# Patient Record
Sex: MALE | Race: BLACK OR AFRICAN AMERICAN | NOT HISPANIC OR LATINO | Employment: FULL TIME | URBAN - METROPOLITAN AREA
[De-identification: names, ages, dates, MRNs, and addresses within clinical notes are randomized per-mention and may not be internally consistent; named-entity substitution may affect disease eponyms.]

---

## 2019-07-15 ENCOUNTER — VIRTUAL VISIT (OUTPATIENT)
Dept: INTERNAL MEDICINE | Facility: CLINIC | Age: 44
End: 2019-07-15

## 2019-07-15 DIAGNOSIS — Z00.00 ENCOUNTER FOR PREVENTIVE HEALTH EXAMINATION: Primary | ICD-10-CM

## 2019-07-15 RX ORDER — ASCORBIC ACID 500 MG
TABLET ORAL
COMMUNITY

## 2019-07-15 ASSESSMENT — ENCOUNTER SYMPTOMS
NAIL CHANGES: 0
SKIN CHANGES: 0
SKIN CHANGES: 0
NAIL CHANGES: 0
POOR WOUND HEALING: 0
POOR WOUND HEALING: 0

## 2019-07-15 ASSESSMENT — ANXIETY QUESTIONNAIRES
6. BECOMING EASILY ANNOYED OR IRRITABLE: NOT AT ALL
4. TROUBLE RELAXING: NOT AT ALL
2. NOT BEING ABLE TO STOP OR CONTROL WORRYING: NOT AT ALL
GAD7 TOTAL SCORE: 0
3. WORRYING TOO MUCH ABOUT DIFFERENT THINGS: NOT AT ALL
1. FEELING NERVOUS, ANXIOUS, OR ON EDGE: NOT AT ALL
7. FEELING AFRAID AS IF SOMETHING AWFUL MIGHT HAPPEN: NOT AT ALL
5. BEING SO RESTLESS THAT IT IS HARD TO SIT STILL: NOT AT ALL
4. TROUBLE RELAXING: NOT AT ALL
3. WORRYING TOO MUCH ABOUT DIFFERENT THINGS: NOT AT ALL
7. FEELING AFRAID AS IF SOMETHING AWFUL MIGHT HAPPEN: NOT AT ALL
GAD7 TOTAL SCORE: 0
1. FEELING NERVOUS, ANXIOUS, OR ON EDGE: NOT AT ALL
7. FEELING AFRAID AS IF SOMETHING AWFUL MIGHT HAPPEN: NOT AT ALL
7. FEELING AFRAID AS IF SOMETHING AWFUL MIGHT HAPPEN: NOT AT ALL
GAD7 TOTAL SCORE: 0
2. NOT BEING ABLE TO STOP OR CONTROL WORRYING: NOT AT ALL
5. BEING SO RESTLESS THAT IT IS HARD TO SIT STILL: NOT AT ALL
6. BECOMING EASILY ANNOYED OR IRRITABLE: NOT AT ALL
GAD7 TOTAL SCORE: 0

## 2019-07-15 ASSESSMENT — PATIENT HEALTH QUESTIONNAIRE - PHQ9
SUM OF ALL RESPONSES TO PHQ QUESTIONS 1-9: 0

## 2019-07-15 NOTE — PROGRESS NOTES
Health Maintenance:  Do you have a PCP? No  When was your last visit with your PCP?   When was your last eye exam? 1 year  Have you ever had a colonoscopy? No  If yes, when?   Have you ever had any polyps removed?     30+ Pack Year Smoking History:  Have you ever had a chest CT to screen for cancer? No    If Male:  (65 years old+ and tobacco use) Have you ever completed an ultrasound of your abdominal aorta?       As part of your visit we will set up a DEXA scan which will measure your body composition. We have a few questions that need to be answered before we can schedule this scan:   What is your approximate weight? 195   Have you ever had a DEXA scan within the past 2 years? No   Will you have any other imaging studies with contrast (x-ray, CT scan) within 7  days of this appointment? No   Have you had any spine or hip surgery? No   Do you take any vitamins that contain calcium or antacids with calcium?     If yes, stop taking 24 hours prior to visit.     Goals for the Visit:  1. Thorough Comprehensive Preventative Exam  2. Derm    Pertinent past Medical/Family and Social HX: Mom breast cancer, son had cancer  Pertinent sx that desire are addressed with this visit:     Answers for HPI/ROS submitted by the patient on 7/15/2019   PHQ9 TOTAL SCORE: 0  DARLENE 7 TOTAL SCORE: 0  General Symptoms: No  Skin Symptoms: Yes  HENT Symptoms: No  EYE SYMPTOMS: No  HEART SYMPTOMS: No  LUNG SYMPTOMS: No  INTESTINAL SYMPTOMS: No  URINARY SYMPTOMS: No  REPRODUCTIVE SYMPTOMS: No  SKELETAL SYMPTOMS: No  BLOOD SYMPTOMS: No  NERVOUS SYSTEM SYMPTOMS: No  MENTAL HEALTH SYMPTOMS: No  Changes in hair: No  Changes in moles/birth marks: No  Itching: Yes  Rashes: No  Changes in nails: No  Acne: No  Change in facial hair: No  Warts: No  Non-healing sores: No  Scarring: No  Flaking of skin: Yes  Color changes of hands/feet in cold : No  Sun sensitivity: No  Skin thickening: No        Instructions prior to appointment:   1. Fast beginning at 10  pm for lab appointment  2. If your preventive care assessment package includes a Fitness Assessment, please bring athletic shoes. Complementary [facility] fitness attire is provided and yours to keep.  3. If eye exam, eyes may be dilated, it will last 4-6 hours, may want to bring sunglasses.   4. May bring laptop or other work materials for use during downtime.   5. You will receive an email about 3 days prior to your visit with a final itinerary, menu selections for the complementary breakfast and lunch and instructions for the visit.     Complimentary  Parking provided. Drop off car in front of [facility], take the patient elevators to the [facility] clinic. When you enter in the lobby, identify yourself as an Executive Health [atient and you will be escorted up to the clinic.   If questions arise prior to your appointment please contact the clinic at [phone].

## 2019-07-16 ASSESSMENT — PATIENT HEALTH QUESTIONNAIRE - PHQ9
SUM OF ALL RESPONSES TO PHQ QUESTIONS 1-9: 0
SUM OF ALL RESPONSES TO PHQ QUESTIONS 1-9: 0

## 2019-07-16 ASSESSMENT — ANXIETY QUESTIONNAIRES
GAD7 TOTAL SCORE: 0
GAD7 TOTAL SCORE: 0

## 2019-07-18 ENCOUNTER — APPOINTMENT (OUTPATIENT)
Dept: INTERNAL MEDICINE | Facility: CLINIC | Age: 44
End: 2019-07-18
Payer: COMMERCIAL

## 2019-07-18 ENCOUNTER — OFFICE VISIT (OUTPATIENT)
Dept: AUDIOLOGY | Facility: CLINIC | Age: 44
End: 2019-07-18
Payer: COMMERCIAL

## 2019-07-18 ENCOUNTER — ANCILLARY PROCEDURE (OUTPATIENT)
Dept: GENERAL RADIOLOGY | Facility: CLINIC | Age: 44
End: 2019-07-18
Attending: INTERNAL MEDICINE
Payer: COMMERCIAL

## 2019-07-18 ENCOUNTER — OFFICE VISIT (OUTPATIENT)
Dept: DERMATOLOGY | Facility: CLINIC | Age: 44
End: 2019-07-18
Payer: COMMERCIAL

## 2019-07-18 ENCOUNTER — OFFICE VISIT (OUTPATIENT)
Dept: OPHTHALMOLOGY | Facility: CLINIC | Age: 44
End: 2019-07-18

## 2019-07-18 ENCOUNTER — ANCILLARY PROCEDURE (OUTPATIENT)
Dept: BONE DENSITY | Facility: CLINIC | Age: 44
End: 2019-07-18
Payer: COMMERCIAL

## 2019-07-18 ENCOUNTER — APPOINTMENT (OUTPATIENT)
Dept: INTERNAL MEDICINE | Facility: CLINIC | Age: 44
End: 2019-07-18

## 2019-07-18 ENCOUNTER — OFFICE VISIT (OUTPATIENT)
Dept: INTERNAL MEDICINE | Facility: CLINIC | Age: 44
End: 2019-07-18

## 2019-07-18 VITALS
HEIGHT: 72 IN | RESPIRATION RATE: 16 BRPM | SYSTOLIC BLOOD PRESSURE: 128 MMHG | WEIGHT: 212 LBS | OXYGEN SATURATION: 97 % | BODY MASS INDEX: 28.71 KG/M2 | HEART RATE: 66 BPM | DIASTOLIC BLOOD PRESSURE: 86 MMHG | TEMPERATURE: 98.1 F

## 2019-07-18 DIAGNOSIS — Z86.69 HISTORY OF UVEITIS: ICD-10-CM

## 2019-07-18 DIAGNOSIS — Z00.00 ENCOUNTER FOR PREVENTIVE HEALTH EXAMINATION: Primary | ICD-10-CM

## 2019-07-18 DIAGNOSIS — Z01.10 EXAMINATION OF EARS AND HEARING: Primary | ICD-10-CM

## 2019-07-18 DIAGNOSIS — Z00.00 ROUTINE GENERAL MEDICAL EXAMINATION AT A HEALTH CARE FACILITY: Primary | ICD-10-CM

## 2019-07-18 DIAGNOSIS — R31.29 MICROSCOPIC HEMATURIA: ICD-10-CM

## 2019-07-18 DIAGNOSIS — Z00.00 ENCOUNTER FOR PREVENTIVE HEALTH EXAMINATION: ICD-10-CM

## 2019-07-18 DIAGNOSIS — Z98.890 STATUS POST RECONSTRUCTION OF ANTERIOR CRUCIATE LIGAMENT: ICD-10-CM

## 2019-07-18 DIAGNOSIS — H52.11 MYOPIA OF RIGHT EYE: Primary | ICD-10-CM

## 2019-07-18 DIAGNOSIS — Z77.122 EXPOSURE TO NOISE: ICD-10-CM

## 2019-07-18 DIAGNOSIS — L30.0 NUMMULAR ECZEMA: ICD-10-CM

## 2019-07-18 DIAGNOSIS — Z98.52 S/P VASECTOMY: ICD-10-CM

## 2019-07-18 DIAGNOSIS — H52.221 REGULAR ASTIGMATISM OF RIGHT EYE: ICD-10-CM

## 2019-07-18 DIAGNOSIS — D70.9 NEUTROPENIA, UNSPECIFIED TYPE (H): ICD-10-CM

## 2019-07-18 DIAGNOSIS — L73.1 PSEUDOFOLLICULITIS BARBAE: Primary | ICD-10-CM

## 2019-07-18 LAB
ALBUMIN UR-MCNC: NEGATIVE MG/DL
ALP SERPL-CCNC: 72 U/L (ref 40–150)
ALT SERPL W P-5'-P-CCNC: 29 U/L (ref 0–70)
APPEARANCE UR: CLEAR
BASOPHILS # BLD AUTO: 0 10E9/L (ref 0–0.2)
BASOPHILS NFR BLD AUTO: 0.8 %
BILIRUB UR QL STRIP: NEGATIVE
CHOLEST SERPL-MCNC: 214 MG/DL
COLOR UR AUTO: YELLOW
CREAT SERPL-MCNC: 0.96 MG/DL (ref 0.66–1.25)
DEPRECATED CALCIDIOL+CALCIFEROL SERPL-MC: 28 UG/L (ref 20–75)
DIFFERENTIAL METHOD BLD: ABNORMAL
EOSINOPHIL # BLD AUTO: 0.2 10E9/L (ref 0–0.7)
EOSINOPHIL NFR BLD AUTO: 5.1 %
ERYTHROCYTE [DISTWIDTH] IN BLOOD BY AUTOMATED COUNT: 13.6 % (ref 10–15)
GFR SERPL CREATININE-BSD FRML MDRD: >90 ML/MIN/{1.73_M2}
GLUCOSE SERPL-MCNC: 94 MG/DL (ref 70–99)
GLUCOSE UR STRIP-MCNC: NEGATIVE MG/DL
HCT VFR BLD AUTO: 46.7 % (ref 40–53)
HDLC SERPL-MCNC: 56 MG/DL
HGB BLD-MCNC: 15.1 G/DL (ref 13.3–17.7)
HGB UR QL STRIP: ABNORMAL
HIV 1+2 AB+HIV1 P24 AG SERPL QL IA: NONREACTIVE
IMM GRANULOCYTES # BLD: 0 10E9/L (ref 0–0.4)
IMM GRANULOCYTES NFR BLD: 0 %
KETONES UR STRIP-MCNC: NEGATIVE MG/DL
LDLC SERPL CALC-MCNC: 126 MG/DL
LEUKOCYTE ESTERASE UR QL STRIP: NEGATIVE
LYMPHOCYTES # BLD AUTO: 1.6 10E9/L (ref 0.8–5.3)
LYMPHOCYTES NFR BLD AUTO: 46 %
MCH RBC QN AUTO: 29.8 PG (ref 26.5–33)
MCHC RBC AUTO-ENTMCNC: 32.3 G/DL (ref 31.5–36.5)
MCV RBC AUTO: 92 FL (ref 78–100)
MONOCYTES # BLD AUTO: 0.4 10E9/L (ref 0–1.3)
MONOCYTES NFR BLD AUTO: 12.4 %
MUCOUS THREADS #/AREA URNS LPF: PRESENT /LPF
NEUTROPHILS # BLD AUTO: 1.3 10E9/L (ref 1.6–8.3)
NEUTROPHILS NFR BLD AUTO: 35.7 %
NITRATE UR QL: NEGATIVE
NONHDLC SERPL-MCNC: 158 MG/DL
NRBC # BLD AUTO: 0 10*3/UL
NRBC BLD AUTO-RTO: 0 /100
PH UR STRIP: 6 PH (ref 5–7)
PLATELET # BLD AUTO: 222 10E9/L (ref 150–450)
PSA SERPL-ACNC: 1.37 UG/L (ref 0–4)
RBC # BLD AUTO: 5.06 10E12/L (ref 4.4–5.9)
RBC #/AREA URNS AUTO: 1 /HPF (ref 0–2)
RETICS # AUTO: 66 10E9/L (ref 25–95)
RETICS/RBC NFR AUTO: 1.3 % (ref 0.5–2)
SOURCE: ABNORMAL
SP GR UR STRIP: 1.01 (ref 1–1.03)
TRIGL SERPL-MCNC: 159 MG/DL
TSH SERPL DL<=0.005 MIU/L-ACNC: 0.99 MU/L (ref 0.4–4)
UROBILINOGEN UR STRIP-MCNC: 0 MG/DL (ref 0–2)
WBC # BLD AUTO: 3.5 10E9/L (ref 4–11)
WBC #/AREA URNS AUTO: <1 /HPF (ref 0–5)

## 2019-07-18 RX ORDER — TRIAMCINOLONE ACETONIDE 1 MG/G
OINTMENT TOPICAL 2 TIMES DAILY
Qty: 80 G | Refills: 3 | Status: SHIPPED | OUTPATIENT
Start: 2019-07-18

## 2019-07-18 RX ORDER — CLINDAMYCIN PHOSPHATE 10 UG/ML
LOTION TOPICAL DAILY
Qty: 60 ML | Refills: 3 | Status: SHIPPED | OUTPATIENT
Start: 2019-07-18

## 2019-07-18 ASSESSMENT — VISUAL ACUITY
METHOD: SNELLEN - LINEAR
CORRECTION_TYPE: GLASSES
OD_CC: 20/20
OD_CC+: -12

## 2019-07-18 ASSESSMENT — REFRACTION_MANIFEST
OS_AXIS: 134
OD_AXIS: 085
OS_CYLINDER: +0.75
OD_SPHERE: -6.00
OS_SPHERE: -0.75
OD_CYLINDER: +2.75

## 2019-07-18 ASSESSMENT — EXTERNAL EXAM - LEFT EYE: OS_EXAM: NORMAL

## 2019-07-18 ASSESSMENT — REFRACTION_WEARINGRX
OD_AXIS: 086
OS_SPHERE: -0.75
OS_CYLINDER: +1.00
SPECS_TYPE: SVL
OD_SPHERE: -6.50
OS_AXIS: 116
OD_CYLINDER: +3.00

## 2019-07-18 ASSESSMENT — CONF VISUAL FIELD
OD_NORMAL: 1
OS_NORMAL: 1
METHOD: COUNTING FINGERS

## 2019-07-18 ASSESSMENT — TONOMETRY
OD_IOP_MMHG: 14
IOP_METHOD: TONOPEN
OS_IOP_MMHG: 12

## 2019-07-18 ASSESSMENT — MIFFLIN-ST. JEOR: SCORE: 1871.69

## 2019-07-18 ASSESSMENT — SLIT LAMP EXAM - LIDS
COMMENTS: NORMAL
COMMENTS: NORMAL

## 2019-07-18 ASSESSMENT — PAIN SCALES - GENERAL
PAINLEVEL: NO PAIN (0)
PAINLEVEL: NO PAIN (0)

## 2019-07-18 ASSESSMENT — CUP TO DISC RATIO: OS_RATIO: 0.2

## 2019-07-18 NOTE — PROGRESS NOTES
AUDIOLOGY REPORT  [facility] assessment- maroon level    SUMMARY: Audiology visit completed. See audiogram for results.      RECOMMENDATIONS: Follow-up with Dr. Ojeda. Repeat hearing evaluation as medically indicated, sooner if concerns arise.      Elayne Lim  Audiologist  [place] License  #[id number]

## 2019-07-18 NOTE — LETTER
"7/18/2019       RE: Carlito Wiseman  [address] [place] [address]     Dear Colleague,    Thank you for referring your patient, Carlito Wiseman, to the [facility] at [facility]. Please see a copy of my visit note below.     History and Physical Examination     SUBJECTIVE: Chief complaint: Carlito (pronounced \"ka-CONNIE-ooh\") presents for preventive health review.     Past Medical History:  1.  History of neutropenia, long-standing, with absolute neutrophil readings of 1700 in 2010, 1600 in 12/2012, 900 in 11/2014, and 1060 in 10/2017; history of normal hemoglobin and platelet levels.  2.  History of uveitis, diagnosed in 2009, 2011, and reportedly 2017, treated with glucocorticoids.  3.  Status post arthroscopic right knee ACL reconstruction, 1991 following high school soccer injury  4.  History of partial left Achilles tear following pickup basketball injury, managed conservatively  5.  History of microscopic hematuria with negative CT in 2011; he does not recall having undergone cystoscopy.  6.  Status post vasectomy, 2015  7.  Impaired fasting glucose  8.  History of intermittent lower back pain, responsive to conservative measures; LS-spine x-rays in 2014 were normal; an MRI in 10/2017 showed disc bulging at L4-5 and L5-S1 with S1 nerve root compression and left greater than right neural foraminal narrowing.       Adverse Drug Reactions: None.     Current Medications:  Daily multivitamin supplement  Vitamin C, 500 mg daily     Habits:  Tobacco: Never  Alcohol: 2 servings, 2 days per week  Caffeine: 3-4 servings per day     Social History:  to Crystal and father of daughters Daniela, age 15, a rising sophomore at [employer] who enjoys tennis and STEM subjects, and Brigid, age 13, who enjoys ballet and will begin eighth grade at [employer] this fall; and son Valentin, age 7, a survivor of neuroblastoma at age 2 months who enjoys reading and " Problem: Goal Outcome Summary  Goal: Goal Outcome Summary  Pt able to empty bladder independently. Ambulation and fluids encouraged. Ibuprofen adequately controlling pain. Independent of self and  cares.         collecting basketball and other sports trading cards who will begin second grade at [employer] this fall.  Carlito was born in [place] and completed middle and high schools in [place], after which he attended [employer], where he studied on an [employer] scholarship.  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  Away from work, he enjoys family activity, socializing, and travel.  He exercises 3-4 days per week, typically 45-60 minutes of aerobic and strength exercise with occasional yoga sessions.    Family History: Mother is 68, with history of breast cancer, diagnosed at age 59.  Father is in good health at age 68.  A sister 10 years his constance is in good health.  Son is a survivor of neuroblastoma.  Daughters are in good health.  Maternal grandfather  prematurely from an unspecified illness, with history of excessive tobacco and alcohol use.  Maternal grandmother  prematurely from lung cancer, with history of tobacco use.  Paternal grandfather  from tobacco-related lung cancer.  Paternal grandmother is in good health at age 95.     Review of Systems: No history of colonoscopy.  Most recent tetanus (Tdap) vaccination was administered in 2012.  Typhoid IM vaccination administered in 2013.  Infrequent mild lower back discomfort, typically after extended runs; symptoms respond to stretching, chiropractic intervention, and acupuncture.  Infrequent snoring, without morning headaches, daytime somnolence, or unrefreshed sensation upon awakening.   Remainder of complete review of systems was negative.      OBJECTIVE:     Vital signs: Height 71.5 inches.  Weight 212 pounds.  Blood pressure 129/82 on average of 3 automated readings.  Heart rate 66.  Respiratory rate 16.  Temperature 98.1 degrees.  O2 saturation 97% on room air.  General: Alert, neatly dressed and groomed, in no acute distress.  HEENT: Atraumatic and normocephalic. Eyelids, pupils, and conjunctivae appeared normal. Lips, teeth and gums appear normal.  Oropharynx showed moist mucous membranes, without exudate or erythema.  Narrow airway.  Neck: Supple, without thyromegaly, mass, or bruit. No cervical or supraclavicular lymphadenopathy.  Back: No spinal or costovertebral angle tenderness.  Chest: Clear to auscultation and percussion. Normal respiratory effort.  Cardiovascular: No jugular venous distention. Regular rate and rhythm, normal S1, S2 without murmur.  Abdomen: Bowel sounds positive; soft, nontender, without rebound, guarding, hepatosplenomegaly or mass.  Extremities: No cyanosis or edema.  Genitalia: Normal male genitalia, without scrotal mass or hernia. No inguinal lymphadenopathy.  Rectal: Normal tone, with smooth, nontender, moderately enlarged prostate. No rectal mass.  Skin: Examination was deferred; full evaluation was completed earlier through dermatology clinic.  Neurologic: Cranial nerves II-XII were grossly intact. Sensory and motor examinations were normal. Normal gait.  Mini-cog score was 4/5; 5/5 with minimal prompting.  Psychiatric: Alert and oriented ×3. Normal affect. Judgment and insight intact.  PHQ and DARLENE scores were both 0.    Creatinine 0.96, alkaline phosphatase 72, ALT 29, cholesterol 214, HDL 56, , triglycerides 159, cholesterol/HDL 3.8, PSA 1.37, TSH 0.99, 25-hydroxy vitamin D 28, glucose 94, white blood cell count 3500, ANC 1300, hemoglobin 15.1, platelets 222,000, urinalysis showed small blood, with 1 rbc/hpf, Lyme disease 0.10 (0.00-0.89) Treponema  nonreactive, HIV nonreactive, peripheral blood morphology showed slight leukopenia and neutropenia with normal neutrophil morphology with no other abnormalities.    EKG showed sinus bradycardia.  Spirometry showed an FEV1 of 3.70, with an FVC of 4.40; readings were 95% and 88% of predicted values, respectively.    DEXA showed normal bone density, with most negative and valid Z-score of -0.5 at the level of the right femoral neck and right total hip.  Body composition analysis showed 27.1% fat (percentile not available); body mass index was 29.2.    Chest x-ray showed no acute pulmonary disease; specifically, neither adenopathy suggestive of sarcoidosis nor lung changes to suggest tuberculosis.    ASSESSMENT:    1.  History of recurrent uveitis.  Additional tests were arranged to screen for underlying conditions, with results showing negative Lyme and Treponema screens, with no chest x-ray evidence of sarcoidosis or tuberculosis; HLA-B27 testing is pending.  If negative, as expected, he will be advised to consider quantiferon testing to further exclude tuberculosis.    2.  Narrow airway with history of snoring.  He denies daytime somnolence, morning headaches, and unrefreshed sensation upon awakening.  We agreed that he would ask his wife to monitor his breathing during sleep.  He will schedule sleep clinic consultation if she observes loud snoring, gasping, or pauses in breathing suggestive of sleep apnea.    3.  Neutropenia.  Long-standing and stable, with peripheral blood morphology remarkable only for mild neutropenia.  He will be advised to continue regular monitoring.    4.  History of microscopic hematuria.  Dipstick positive with normal RBC count on microscopy.  History of negative evaluation including CT in 2011.  In the event of recurrent significant hematuria, I would recommend proceeding with urology consultation for cystoscopy.    5.  Preventive care.  I recommended screening colonoscopy.  Immunization  "status is up-to-date.  We discussed the debate regarding the benefit of multivitamin supplements and the importance of selecting a product that does not contain iron should he choose to continue.  He was advised to discontinue his use of supplemental vitamin C and to seek dietary sources of vitamin C instead.  I recommended daily use of a 2000-international unit vitamin D3 supplement.  Using guidelines from the AHA/ACC, his estimated 10-year risk of a vascular event is 4.3% (optimal for his age/gender cohort is 3.2%).  He was congratulated for his regimen of regular exercise and advised to consider interval workouts for time economy and long, slow, distance workouts for \"fat-burning\" effect.  We reviewed strategies to reduce weight and discussed guidelines for appropriate body fat percentage and body mass index.    PLAN: See above.     ~SRT      Again, thank you for allowing me to participate in the care of your patient.      Sincerely,    Jim Ojeda MD      "

## 2019-07-18 NOTE — LETTER
"7/18/2019    RE: Carlito Wiseman  [address]  [facility] [address]      History and Physical Examination     SUBJECTIVE: Chief complaint: Carlito (pronounced \"ka-CONNIE-ooh\") presents for preventive health review.     Past Medical History:  1.  History of neutropenia, long-standing, with absolute neutrophil readings of 1700 in 2010, 1600 in 12/2012, 900 in 11/2014, and 1060 in 10/2017; history of normal hemoglobin and platelet levels.  2.  History of uveitis, diagnosed in 2009, 2011, and reportedly 2017, treated with glucocorticoids.  3.  Status post arthroscopic right knee ACL reconstruction, 1991 following high school soccer injury  4.  History of partial left Achilles tear following pickup basketball injury, managed conservatively  5.  History of microscopic hematuria with negative CT in 2011; he does not recall having undergone cystoscopy.  6.  Status post vasectomy, 2015  7.  Impaired fasting glucose  8.  History of intermittent lower back pain, responsive to conservative measures; LS-spine x-rays in 2014 were normal; an MRI in 10/2017 showed disc bulging at L4-5 and L5-S1 with S1 nerve root compression and left greater than right neural foraminal narrowing.       Adverse Drug Reactions: None.     Current Medications:  Daily multivitamin supplement  Vitamin C, 500 mg daily     Habits:  Tobacco: Never  Alcohol: 2 servings, 2 days per week  Caffeine: 3-4 servings per day     Social History:  to Amanda and father of daughters Daniela, age 15, a rising sophomore at [employer] who enjoys tennis and STEM subjects, and Brigid, age 13, who enjoys ballet and will begin eighth grade at [employer] this fall; and son Valentin, age 7, a survivor of neuroblastoma at age 2 months who enjoys reading and collecting basketball and other sports trading cards who will begin second grade at [employer] this fall.  Carlito was born in [place] and completed middle and high schools in [place], after which he attended " [employer], where he studied on an [employer] scholarship.  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  Away from work, he enjoys family activity, socializing, and travel.  He exercises 3-4 days per week, typically 45-60 minutes of aerobic and strength exercise with occasional yoga sessions.    Family History: Mother is 68, with history of breast cancer, diagnosed at age 59.  Father is in good health at age 68.  A sister 10 years his constance is in good health.  Son is a survivor of neuroblastoma.  Daughters are in good health.  Maternal grandfather  prematurely from an unspecified illness, with history of excessive tobacco and alcohol use.  Maternal grandmother  prematurely from lung cancer, with history of tobacco use.  Paternal grandfather  from tobacco-related lung cancer.  Paternal grandmother is in good health at age 95.     Review of Systems: No history of colonoscopy.  Most recent tetanus (Tdap) vaccination was administered in 2012.  Typhoid IM vaccination administered in 2013.  Infrequent mild lower back discomfort, typically after extended runs; symptoms respond to stretching, chiropractic intervention, and acupuncture.  Infrequent snoring, without morning headaches, daytime somnolence, or unrefreshed sensation upon awakening.  Remainder of complete review of systems was negative.      OBJECTIVE:     Vital signs: Height 71.5 inches.  Weight 212 pounds.  Blood pressure 129/82 on average of 3 automated readings.  Heart rate 66.   Respiratory rate 16.  Temperature 98.1 degrees.  O2 saturation 97% on room air.  General: Alert, neatly dressed and groomed, in no acute distress.  HEENT: Atraumatic and normocephalic. Eyelids, pupils, and conjunctivae appeared normal. Lips, teeth and gums appear normal.  Oropharynx showed moist mucous membranes, without exudate or erythema.  Narrow airway.  Neck: Supple, without thyromegaly, mass, or bruit. No cervical or supraclavicular lymphadenopathy.  Back: No spinal or costovertebral angle tenderness.  Chest: Clear to auscultation and percussion. Normal respiratory effort.  Cardiovascular: No jugular venous distention. Regular rate and rhythm, normal S1, S2 without murmur.  Abdomen: Bowel sounds positive; soft, nontender, without rebound, guarding, hepatosplenomegaly or mass.  Extremities: No cyanosis or edema.  Genitalia: Normal male genitalia, without scrotal mass or hernia. No inguinal lymphadenopathy.  Rectal: Normal tone, with smooth, nontender, moderately enlarged prostate. No rectal mass.  Skin: Examination was deferred; full evaluation was completed earlier through dermatology clinic.  Neurologic: Cranial nerves II-XII were grossly intact. Sensory and motor examinations were normal. Normal gait.  Mini-cog score was 4/5; 5/5 with minimal prompting.  Psychiatric: Alert and oriented ×3. Normal affect. Judgment and insight intact.  PHQ and DARLENE scores were both 0.    Creatinine 0.96, alkaline phosphatase 72, ALT 29, cholesterol 214, HDL 56, , triglycerides 159, cholesterol/HDL 3.8, PSA 1.37, TSH 0.99, 25-hydroxy vitamin D 28, glucose 94, white blood cell count 3500, ANC 1300, hemoglobin 15.1, platelets 222,000, urinalysis showed small blood, with 1 rbc/hpf, Lyme disease 0.10 (0.00-0.89) Treponema nonreactive, HIV nonreactive, peripheral blood morphology showed slight leukopenia and neutropenia with normal neutrophil morphology with no other abnormalities.    EKG showed sinus bradycardia.  Spirometry  showed an FEV1 of 3.70, with an FVC of 4.40; readings were 95% and 88% of predicted values, respectively.    DEXA showed normal bone density, with most negative and valid Z-score of -0.5 at the level of the right femoral neck and right total hip.  Body composition analysis showed 27.1% fat (percentile not available); body mass index was 29.2.    Chest x-ray showed no acute pulmonary disease; specifically, neither adenopathy suggestive of sarcoidosis nor lung changes to suggest tuberculosis.    ASSESSMENT:    1.  History of recurrent uveitis.  Additional tests were arranged to screen for underlying conditions, with results showing negative Lyme and Treponema screens, with no chest x-ray evidence of sarcoidosis or tuberculosis; HLA-B27 testing is pending.  If negative, as expected, he will be advised to consider quantiferon testing to further exclude tuberculosis.    2.  Narrow airway with history of snoring.  He denies daytime somnolence, morning headaches, and unrefreshed sensation upon awakening.  We agreed that he would ask his wife to monitor his breathing during sleep.  He will schedule sleep clinic consultation if she observes loud snoring, gasping, or pauses in breathing suggestive of sleep apnea.    3.  Neutropenia.  Long-standing and stable, with peripheral blood morphology remarkable only for mild neutropenia.  He will be advised to continue regular monitoring.    4.  History of microscopic hematuria.  Dipstick positive with normal RBC count on microscopy.  History of negative evaluation including CT in 2011.  In the event of recurrent significant hematuria, I would recommend proceeding with urology consultation for cystoscopy.    5.  Preventive care.  I recommended screening colonoscopy.  Immunization status is up-to-date.  We discussed the debate regarding the benefit of multivitamin supplements and the importance of selecting a product that does not contain iron should he choose to continue.  He was  "advised to discontinue his use of supplemental vitamin C and to seek dietary sources of vitamin C instead.  I recommended daily use of a 2000-international unit vitamin D3 supplement.  Using guidelines from the AHA/ACC, his estimated 10-year risk of a vascular event is 4.3% (optimal for his age/gender cohort is 3.2%).  He was congratulated for his regimen of regular exercise and advised to consider interval workouts for time economy and long, slow, distance workouts for \"fat-burning\" effect.  We reviewed strategies to reduce weight and discussed guidelines for appropriate body fat percentage and body mass index.    PLAN: See above.     ~SRT    Jim Ojeda MD      "

## 2019-07-18 NOTE — LETTER
7/18/2019       RE: Carlito Wiseman  [address]  [facility] [address]     Dear Colleague,    Thank you for referring your patient, Carlito Wiseman, to the [facility] DERMATOLOG[facility] at [facility]. Please see a copy of my visit note below.    CHIEF COMPLAINT:  [facility] visit.      HISTORY OF PRESENT ILLNESS:  Mr. Carlito Wiseman is a very pleasant 44-year-old male who is a new patient to our clinic, presenting today for a skin cancer screening exam as part of his [facility] visit.  Regarding skin cancer history, he denies any history of melanoma or nonmelanoma skin cancer and has no known skin cancers in any of his family members.  He denies any new or changing moles and has no localized areas of nonhealing sores, painful or bleeding areas.  He has 2 skin inflammation concerns today.  The first is recurrent itchy, scaly round spots on his lower legs which have been present for the past year and worse in the winter.  Of note, he is originally from [place] and moved to [place] in the last decade and has noticed this problem since moving here.  He is not currently using any prescribed treatments for these areas but does use Aquaphor as one of his children has atopic dermatitis.  The second issue is longstanding and relates to beard bumps.  He reports recurrent areas of irritation along his beard line when shaving.  At home, he is now using a straight razor and also uses products from a company called [employer] aimed at -American males with problems with shaving bumps.  He reports that this is notably helpful, but he is interested in other medical options if they are available.      REVIEW OF SYSTEMS:  No recent fevers or chills.  No oral sores.      SOCIAL HISTORY:  He works for the [employer].      PHYSICAL EXAMINATION:   GENERAL:  This is a well-appearing, well-nourished male with a normal mood and affect who is oriented x3.   SKIN:  A cutaneous  exam of the head, neck, chest, abdomen, back, bilateral upper and lower extremities and buttocks was performed.  Diffusely on the back, flanks and shoulders, there are many 1 mm domed dark brown papules consistent with dermatosis papulosa nigra.  Circumferentially on the neck in the beard distribution, there is follicular prominence and 1 mm, flesh-toned follicular papules.  On the bilateral lower extremities, there are thin, flat-topped, erythematous, finely scaled nummular plaques.      ASSESSMENT AND PLAN:   1.  Mild nummular eczema of the lower extremities.  I gave him a prescription for triamcinolone 0.1% ointment to be applied twice daily for flareups.  He understands he may also use this at bedtime under Saran wrap occlusion for significant flares.  We also discussed the importance of ongoing gentle skin care and liberal emollient such as Aquaphor, especially immediately after bathing during winter months.   2.  Pseudofolliculitis barbae.  He will continue his home regimen which has been helpful including a straight razor and Bevel products.  I also gave him a prescription for clindamycin 1% solution to be applied daily for flareups.  We could also consider additional options such as desonide ointment or doxycycline in the future if he has significant flares.   3.  Benign skin check in an -American patient.  We discussed today the overall low risk of skin cancer among -American patients and also discussed the usual signs and symptoms of skin cancer, both melanoma and nonmelanoma skin cancers.  We discussed sunscreen and sun avoidance as options to avoid skin darkening and photoaging.  Otherwise, he was reassured as to the benign nature of his exam today.   4.  He will follow up in our clinic on an as-needed basis.       Feliz Wilkinson MD  Dermatology Attending

## 2019-07-18 NOTE — NURSING NOTE
AHA BP    1st    128/80  2nd   131/81  3rd    128/86    Average   129/82  Emily Lu CMA 8:15 AM on 7/18/2019.

## 2019-07-18 NOTE — NURSING NOTE
Dermatology Rooming Note    Carlito Wiseman's goals for this visit include:   Chief Complaint   Patient presents with     Skin Check     [facility] skin check. No personal or family hx of skin cancer.     Margaret Toro, CMA

## 2019-07-18 NOTE — PROGRESS NOTES
Carlito Wiseman comes into clinic today at the request of Dr. JENNIFER Ojeda Ordering Provider for EKG.    This service provided today was under the supervising provider of the day Dr. JENNIFER Ojeda, who was available if needed.    Emily Lu

## 2019-07-18 NOTE — NURSING NOTE
Chief Complaint   Patient presents with     Physical     Patient is here for annual physical     Emily Lu CMA 7:14 AM on 7/18/2019.

## 2019-07-18 NOTE — PROGRESS NOTES
Chief Complaint(s) and History of Present Illness(es)     COMPREHENSIVE EYE EXAM     Laterality: both eyes    Associated symptoms: Negative for eye pain, tearing, dryness, flashes and   floaters              Comments     Patient notes that he has SA, uses optcon prn for symptoms    Ellen Stake July 18, 2019 8:07 AM         Has had uveitis 3x in the right, no cause identified. Worked up initially at [facility] in 2012, then second and third episode were managed at [facility] most recent was 2017. No underlying etiology found.            Assessment & Plan     Carlito Wiseman is a 44 year old male with the following diagnoses:   1. Myopia of right eye    2. Regular astigmatism of right eye    3. History of uveitis - Right Eye       Disp MRx  Annual follow up       Attending Physician Attestation:  Complete documentation of historical and exam elements from today's encounter can be found in the full encounter summary report (not reduplicated in this progress note).  I personally obtained the chief complaint(s) and history of present illness.  I confirmed and edited as necessary the review of systems, past medical/surgical history, family history, social history, and examination findings as documented by others; and I examined the patient myself.  I personally reviewed the relevant tests, images, and reports as documented above.  I formulated and edited as necessary the assessment and plan and discussed the findings and management plan with the patient and family. - Radha Johnson MD

## 2019-07-18 NOTE — PROGRESS NOTES
CHIEF COMPLAINT:  [facility] visit.      HISTORY OF PRESENT ILLNESS:  Mr. Carlito Wiseman is a very pleasant 44-year-old male who is a new patient to our clinic, presenting today for a skin cancer screening exam as part of his [facility] visit.  Regarding skin cancer history, he denies any history of melanoma or nonmelanoma skin cancer and has no known skin cancers in any of his family members.  He denies any new or changing moles and has no localized areas of nonhealing sores, painful or bleeding areas.  He has 2 skin inflammation concerns today.  The first is recurrent itchy, scaly round spots on his lower legs which have been present for the past year and worse in the winter.  Of note, he is originally from [place] and moved to [place] in the last decade and has noticed this problem since moving here.  He is not currently using any prescribed treatments for these areas but does use Aquaphor as one of his children has atopic dermatitis.  The second issue is longstanding and relates to beard bumps.  He reports recurrent areas of irritation along his beard line when shaving.  At home, he is now using a straight razor and also uses products from a company called [employer] aimed at -American males with problems with shaving bumps.  He reports that this is notably helpful, but he is interested in other medical options if they are available.      REVIEW OF SYSTEMS:  No recent fevers or chills.  No oral sores.      SOCIAL HISTORY:  He works for the [employer].      PHYSICAL EXAMINATION:   GENERAL:  This is a well-appearing, well-nourished male with a normal mood and affect who is oriented x3.   SKIN:  A cutaneous exam of the head, neck, chest, abdomen, back, bilateral upper and lower extremities and buttocks was performed.  Diffusely on the back, flanks and shoulders, there are many 1 mm domed dark brown papules consistent with dermatosis papulosa nigra.  Circumferentially on the neck in the  beard distribution, there is follicular prominence and 1 mm, flesh-toned follicular papules.  On the bilateral lower extremities, there are thin, flat-topped, erythematous, finely scaled nummular plaques.      ASSESSMENT AND PLAN:   1.  Mild nummular eczema of the lower extremities.  I gave him a prescription for triamcinolone 0.1% ointment to be applied twice daily for flareups.  He understands he may also use this at bedtime under Saran wrap occlusion for significant flares.  We also discussed the importance of ongoing gentle skin care and liberal emollient such as Aquaphor, especially immediately after bathing during winter months.   2.  Pseudofolliculitis barbae.  He will continue his home regimen which has been helpful including a straight razor and Bevel products.  I also gave him a prescription for clindamycin 1% solution to be applied daily for flareups.  We could also consider additional options such as desonide ointment or doxycycline in the future if he has significant flares.   3.  Benign skin check in an -American patient.  We discussed today the overall low risk of skin cancer among -American patients and also discussed the usual signs and symptoms of skin cancer, both melanoma and nonmelanoma skin cancers.  We discussed sunscreen and sun avoidance as options to avoid skin darkening and photoaging.  Otherwise, he was reassured as to the benign nature of his exam today.   4.  He will follow up in our clinic on an as-needed basis.       Feliz Wilkinson MD  Dermatology Attending

## 2019-07-19 DIAGNOSIS — Z00.00 ENCOUNTER FOR PREVENTIVE HEALTH EXAMINATION: Primary | ICD-10-CM

## 2019-07-19 PROBLEM — Z98.52 S/P VASECTOMY: Status: ACTIVE | Noted: 2019-07-19

## 2019-07-19 PROBLEM — Z98.890 STATUS POST RECONSTRUCTION OF ANTERIOR CRUCIATE LIGAMENT: Status: ACTIVE | Noted: 2019-07-19

## 2019-07-19 PROBLEM — R31.29 MICROSCOPIC HEMATURIA: Status: ACTIVE | Noted: 2019-07-19

## 2019-07-19 PROBLEM — D70.9 NEUTROPENIA, UNSPECIFIED TYPE (H): Status: ACTIVE | Noted: 2019-07-19

## 2019-07-19 PROBLEM — Z86.69 HISTORY OF UVEITIS: Status: ACTIVE | Noted: 2019-07-19

## 2019-07-19 LAB
B BURGDOR IGG+IGM SER QL: 0.1 (ref 0–0.89)
COPATH REPORT: NORMAL
INTERPRETATION ECG - MUSE: NORMAL
T PALLIDUM AB SER QL: NONREACTIVE

## 2019-07-19 NOTE — PROGRESS NOTES
" History and Physical Examination     SUBJECTIVE: Chief complaint: Carlito (pronounced \"ka-CONNIE-ooh\") presents for preventive health review.     Past Medical History:  1.  History of neutropenia, long-standing, with absolute neutrophil readings of 1700 in 2010, 1600 in 12/2012, 900 in 11/2014, and 1060 in 10/2017; history of normal hemoglobin and platelet levels.  2.  History of uveitis, diagnosed in 2009, 2011, and reportedly 2017, treated with glucocorticoids.  3.  Status post arthroscopic right knee ACL reconstruction, 1991 following high school soccer injury  4.  History of partial left Achilles tear following pickup basketball injury, managed conservatively  5.  History of microscopic hematuria with negative CT in 2011; he does not recall having undergone cystoscopy.  6.  Status post vasectomy, 2015  7.  Impaired fasting glucose  8.  History of intermittent lower back pain, responsive to conservative measures; LS-spine x-rays in 2014 were normal; an MRI in 10/2017 showed disc bulging at L4-5 and L5-S1 with S1 nerve root compression and left greater than right neural foraminal narrowing.       Adverse Drug Reactions: None.     Current Medications:  Daily multivitamin supplement  Vitamin C, 500 mg daily     Habits:  Tobacco: Never  Alcohol: 2 servings, 2 days per week  Caffeine: 3-4 servings per day     Social History:  to Amanda and father of daughters Daniela, age 15, a rising sophomore at [employer] who enjoys tennis and STEM subjects, and Brigid, age 13, who enjoys ballet and will begin eighth grade at [employer] this fall; and son Valentin, age 7, a survivor of neuroblastoma at age 2 months who enjoys reading and collecting basketball and other sports trading cards who will begin second grade at [employer] this fall.  Carlito was born in [place] and completed middle and high schools in [place], after which he attended [employer], where he studied on an [employer] scholarship.  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  Away from work, he enjoys family activity, socializing, and travel.  He exercises 3-4 days per week, typically 45-60 minutes of aerobic and strength exercise with occasional yoga sessions.    Family History: Mother is 68, with history of breast cancer, diagnosed at age 59.  Father is in good health at age 68.  A sister 10 years his constance is in good health.  Son is a survivor of neuroblastoma.  Daughters are in good health.  Maternal grandfather  prematurely from an unspecified illness, with history of excessive tobacco and alcohol use.  Maternal grandmother  prematurely from lung cancer, with history of tobacco use.  Paternal grandfather  from tobacco-related lung cancer.  Paternal grandmother is in good health at age 95.     Review of Systems: No history of colonoscopy.  Most recent tetanus (Tdap) vaccination was administered in 2012.  Typhoid IM vaccination administered in 2013.  Infrequent mild lower back discomfort, typically after extended runs; symptoms respond to stretching, chiropractic intervention, and acupuncture.  Infrequent snoring, without morning headaches, daytime somnolence, or unrefreshed sensation upon awakening.  Remainder of complete review of systems was negative.      OBJECTIVE:     Vital signs: Height 71.5 inches.  Weight 212 pounds.  Blood pressure 129/82 on average of 3 automated readings.  Heart rate 66.  Respiratory rate 16.  Temperature 98.1 degrees.  O2 saturation 97% on room air.  General:  Alert, neatly dressed and groomed, in no acute distress.  HEENT: Atraumatic and normocephalic. Eyelids, pupils, and conjunctivae appeared normal. Lips, teeth and gums appear normal.  Oropharynx showed moist mucous membranes, without exudate or erythema.  Narrow airway.  Neck: Supple, without thyromegaly, mass, or bruit. No cervical or supraclavicular lymphadenopathy.  Back: No spinal or costovertebral angle tenderness.  Chest: Clear to auscultation and percussion. Normal respiratory effort.  Cardiovascular: No jugular venous distention. Regular rate and rhythm, normal S1, S2 without murmur.  Abdomen: Bowel sounds positive; soft, nontender, without rebound, guarding, hepatosplenomegaly or mass.  Extremities: No cyanosis or edema.  Genitalia: Normal male genitalia, without scrotal mass or hernia. No inguinal lymphadenopathy.  Rectal: Normal tone, with smooth, nontender, moderately enlarged prostate. No rectal mass.  Skin: Examination was deferred; full evaluation was completed earlier through dermatology clinic.  Neurologic: Cranial nerves II-XII were grossly intact. Sensory and motor examinations were normal. Normal gait.  Mini-cog score was 4/5; 5/5 with minimal prompting.  Psychiatric: Alert and oriented ×3. Normal affect. Judgment and insight intact.  PHQ and DARLENE scores were both 0.    Creatinine 0.96, alkaline phosphatase 72, ALT 29, cholesterol 214, HDL 56, , triglycerides 159, cholesterol/HDL 3.8, PSA 1.37, TSH 0.99, 25-hydroxy vitamin D 28, glucose 94, white blood cell count 3500, ANC 1300, hemoglobin 15.1, platelets 222,000, urinalysis showed small blood, with 1 rbc/hpf, Lyme disease 0.10 (0.00-0.89) Treponema nonreactive, HIV nonreactive, peripheral blood morphology showed slight leukopenia and neutropenia with normal neutrophil morphology with no other abnormalities.    EKG showed sinus bradycardia.  Spirometry showed an FEV1 of 3.70, with an FVC of 4.40; readings were 95% and 88% of predicted  values, respectively.    DEXA showed normal bone density, with most negative and valid Z-score of -0.5 at the level of the right femoral neck and right total hip.  Body composition analysis showed 27.1% fat (percentile not available); body mass index was 29.2.    Chest x-ray showed no acute pulmonary disease; specifically, neither adenopathy suggestive of sarcoidosis nor lung changes to suggest tuberculosis.    ASSESSMENT:    1.  History of recurrent uveitis.  Additional tests were arranged to screen for underlying conditions, with results showing negative Lyme and Treponema screens, with no chest x-ray evidence of sarcoidosis or tuberculosis; HLA-B27 testing is pending.  If negative, as expected, he will be advised to consider quantiferon testing to further exclude tuberculosis.    2.  Narrow airway with history of snoring.  He denies daytime somnolence, morning headaches, and unrefreshed sensation upon awakening.  We agreed that he would ask his wife to monitor his breathing during sleep.  He will schedule sleep clinic consultation if she observes loud snoring, gasping, or pauses in breathing suggestive of sleep apnea.    3.  Neutropenia.  Long-standing and stable, with peripheral blood morphology remarkable only for mild neutropenia.  He will be advised to continue regular monitoring.    4.  History of microscopic hematuria.  Dipstick positive with normal RBC count on microscopy.  History of negative evaluation including CT in 2011.  In the event of recurrent significant hematuria, I would recommend proceeding with urology consultation for cystoscopy.    5.  Preventive care.  I recommended screening colonoscopy.  Immunization status is up-to-date.  We discussed the debate regarding the benefit of multivitamin supplements and the importance of selecting a product that does not contain iron should he choose to continue.  He was advised to discontinue his use of supplemental vitamin C and to seek dietary sources of  "vitamin C instead.  I recommended daily use of a 2000-international unit vitamin D3 supplement.  Using guidelines from the AHA/ACC, his estimated 10-year risk of a vascular event is 4.3% (optimal for his age/gender cohort is 3.2%).  He was congratulated for his regimen of regular exercise and advised to consider interval workouts for time economy and long, slow, distance workouts for \"fat-burning\" effect.  We reviewed strategies to reduce weight and discussed guidelines for appropriate body fat percentage and body mass index.    PLAN: See above.     ~SRT    "

## 2019-07-22 LAB
B LOCUS: NORMAL
B27TEST METHOD: NORMAL

## 2019-07-23 LAB
EXPTIME-PRE: 7.59 SEC
FEF2575-%PRED-PRE: 117 %
FEF2575-PRE: 4.38 L/SEC
FEF2575-PRED: 3.74 L/SEC
FEFMAX-%PRED-PRE: 102 %
FEFMAX-PRE: 10.59 L/SEC
FEFMAX-PRED: 10.34 L/SEC
FEV1-%PRED-PRE: 95 %
FEV1-PRE: 3.78 L
FEV1FEV6-PRE: 86 %
FEV1FEV6-PRED: 81 %
FEV1FVC-PRE: 86 %
FEV1FVC-PRED: 80 %
FIFMAX-PRE: 7.34 L/SEC
FVC-%PRED-PRE: 88 %
FVC-PRE: 4.4 L
FVC-PRED: 4.95 L

## 2019-08-02 ENCOUNTER — TELEPHONE (OUTPATIENT)
Dept: INTERNAL MEDICINE | Facility: CLINIC | Age: 44
End: 2019-08-02

## 2019-08-02 NOTE — TELEPHONE ENCOUNTER
Message left regarding recent positive HLA-B27 screen.  The implications of this finding were reviewed, including the possible association with ankylosing spondylitis and reactive arthritis, neither which I believe he has currently.  Also mentioned was the association with uveitis, and my believe that his recurrent uveitis may well be related to this finding.  He was advised to discuss this finding with his usual provider and to pursue the previously recommended QuantiFERON-TB Gold Plus test to exclude TB.

## 2019-08-11 PROBLEM — L73.1 PSEUDOFOLLICULITIS BARBAE: Status: ACTIVE | Noted: 2019-08-11

## 2019-08-11 PROBLEM — L30.0 NUMMULAR ECZEMA: Status: ACTIVE | Noted: 2019-08-11

## 2020-03-02 ENCOUNTER — HEALTH MAINTENANCE LETTER (OUTPATIENT)
Age: 45
End: 2020-03-02

## 2020-12-14 ENCOUNTER — HEALTH MAINTENANCE LETTER (OUTPATIENT)
Age: 45
End: 2020-12-14

## 2021-07-15 ASSESSMENT — ANXIETY QUESTIONNAIRES
3. WORRYING TOO MUCH ABOUT DIFFERENT THINGS: NOT AT ALL
GAD7 TOTAL SCORE: 0
1. FEELING NERVOUS, ANXIOUS, OR ON EDGE: NOT AT ALL
6. BECOMING EASILY ANNOYED OR IRRITABLE: NOT AT ALL
GAD7 TOTAL SCORE: 0
6. BECOMING EASILY ANNOYED OR IRRITABLE: NOT AT ALL
7. FEELING AFRAID AS IF SOMETHING AWFUL MIGHT HAPPEN: NOT AT ALL
GAD7 TOTAL SCORE: 0
1. FEELING NERVOUS, ANXIOUS, OR ON EDGE: NOT AT ALL
5. BEING SO RESTLESS THAT IT IS HARD TO SIT STILL: NOT AT ALL
7. FEELING AFRAID AS IF SOMETHING AWFUL MIGHT HAPPEN: NOT AT ALL
4. TROUBLE RELAXING: NOT AT ALL
3. WORRYING TOO MUCH ABOUT DIFFERENT THINGS: NOT AT ALL
GAD7 TOTAL SCORE: 0
GAD7 TOTAL SCORE: 0
2. NOT BEING ABLE TO STOP OR CONTROL WORRYING: NOT AT ALL
5. BEING SO RESTLESS THAT IT IS HARD TO SIT STILL: NOT AT ALL
7. FEELING AFRAID AS IF SOMETHING AWFUL MIGHT HAPPEN: NOT AT ALL
7. FEELING AFRAID AS IF SOMETHING AWFUL MIGHT HAPPEN: NOT AT ALL
2. NOT BEING ABLE TO STOP OR CONTROL WORRYING: NOT AT ALL
4. TROUBLE RELAXING: NOT AT ALL
GAD7 TOTAL SCORE: 0

## 2021-07-15 ASSESSMENT — ENCOUNTER SYMPTOMS
MYALGIAS: 0
CONSTIPATION: 0
RECTAL PAIN: 0
JOINT SWELLING: 0
HEARTBURN: 0
VOMITING: 0
DIARRHEA: 0
STIFFNESS: 1
ARTHRALGIAS: 0
BOWEL INCONTINENCE: 0
ABDOMINAL PAIN: 1
MUSCLE WEAKNESS: 0
JAUNDICE: 0
BACK PAIN: 1
NAUSEA: 0
BLOATING: 0
BLOOD IN STOOL: 0
MUSCLE CRAMPS: 0
NECK PAIN: 0

## 2021-07-16 ASSESSMENT — ANXIETY QUESTIONNAIRES
GAD7 TOTAL SCORE: 0
GAD7 TOTAL SCORE: 0

## 2021-07-19 ENCOUNTER — VIRTUAL VISIT (OUTPATIENT)
Dept: INTERNAL MEDICINE | Facility: CLINIC | Age: 46
End: 2021-07-19

## 2021-07-19 DIAGNOSIS — Z00.00 ENCOUNTER FOR PREVENTATIVE ADULT HEALTH CARE EXAMINATION: Primary | ICD-10-CM

## 2021-07-19 PROCEDURE — 99207 PR NO CHARGE LOS: CPT

## 2021-07-19 RX ORDER — CHLORAL HYDRATE 500 MG
1000 CAPSULE ORAL
COMMUNITY

## 2021-07-19 RX ORDER — BENZOYL PEROXIDE 50 MG/ML
LIQUID TOPICAL
COMMUNITY
Start: 2021-05-24

## 2021-07-19 RX ORDER — HYDROCORTISONE 25 MG/G
OINTMENT TOPICAL
COMMUNITY
Start: 2020-12-21

## 2021-07-19 RX ORDER — TRETINOIN 1 MG/G
CREAM TOPICAL
COMMUNITY
Start: 2021-05-22

## 2021-07-19 NOTE — PROGRESS NOTES
Health Maintenance:  Do you have a PCP? Yes  When was your last visit with your PCP? 9/2020  When was your last eye exam? 9/2020  Have you ever had a colonoscopy? No  If yes, when?   Have you ever had any polyps removed?       As part of your visit we will set up a DEXA scan which will measure your body composition. We have a few questions that need to be answered before we can schedule this scan:   What is your approximate weight? 210   Have you ever had a DEXA scan within the past 2 years? Yes   Will you have any other imaging studies with contrast (x-ray, CT scan) within 7  days of this appointment? No   Have you had any spine or hip surgery? No   Do you take any vitamins that contain calcium or antacids with calcium? No    If yes, stop taking 24 hours prior to visit.     Goals for the Visit:  1. Thorough Comprehensive Preventive Exam  2. Abdominal discomfort on vacation  3.   Pertinent past Medical/Family and Social HX:   Pertinent sx that desire are addressed with this visit:     Answers for HPI/ROS submitted by the patient on 7/15/2021  DARLENE 7 TOTAL SCORE: 0  General Symptoms: No  Skin Symptoms: No  HENT Symptoms: No  EYE SYMPTOMS: No  HEART SYMPTOMS: No  LUNG SYMPTOMS: No  INTESTINAL SYMPTOMS: Yes  URINARY SYMPTOMS: No  REPRODUCTIVE SYMPTOMS: No  SKELETAL SYMPTOMS: Yes  BLOOD SYMPTOMS: No  NERVOUS SYSTEM SYMPTOMS: No  MENTAL HEALTH SYMPTOMS: No  Heart burn or indigestion: No  Nausea: No  Vomiting: No  Abdominal pain: Yes  Bloating: No  Constipation: No  Diarrhea: No  Blood in stool: No  Black stools: No  Rectal or Anal pain: No  Fecal incontinence: No  Yellowing of skin or eyes: No  Vomit with blood: No  Change in stools: No  Back pain: Yes  Muscle aches: No  Neck pain: No  Swollen joints: No  Joint pain: No  Bone pain: No  Muscle cramps: No  Muscle weakness: No  Joint stiffness: Yes  Bone fracture: No        Instructions prior to appointment:   1. Fast beginning at 10 pm for lab appointment  2. If your  preventive care assessment package includes a Fitness Assessment, please bring athletic shoes. Complementary [facility] fitness attire is provided and yours to keep.  3. If eye exam, eyes may be dilated, it will last 4-6 hours, may want to bring sunglasses.   4. May bring laptop or other work materials for use during downtime.   5. You will receive an email about 3 days prior to your visit with a final itinerary, menu selections for the complementary breakfast and lunch and instructions for the visit.     Complimentary  Parking provided. Drop off car in front of [facility], take the patient elevators to the [facility]. When you enter in the lobby, identify yourself as an [facility] [atient and you will be escorted up to the clinic.   If questions arise prior to your appointment please contact the clinic at [phone].

## 2021-07-27 NOTE — PROGRESS NOTES
[facility] Dermatology Note  Encounter Date: Jul 28, 2021  Office Visit     Dermatology Problem List:  #. Skin Check 7/28/2021     ____________________________________________    Assessment & Plan:    #. Seborrheic keratoses  - Benign patient reassured.    #. History of pseudofolliculitis barbae  - Well controlled with topicals from outside dermatologist    #. Mild nummular eczema  - Well controlled with emollients.     Procedures Performed:   None     Follow-up: prn for new or changing lesions    Staff and Scribe:     Provider Disclosure:   The documentation recorded by the scribe accurately reflects the services I personally performed and the decisions made by me.    All risks, benefits and alternatives were discussed with patient.  Patient is in agreement and understands the assessment and plan.  All questions were answered.  Return to Clinic prn  Patricia Marte PA-C   [facility]     Scribe Disclosure:  I, Aimee Lora, am serving as a scribe to document services personally performed by Patricia Marte PA-C based on data collection and the provider's statements to me.   ____________________________________________    CC: Skin Check (carlito is coming in today for a skin check, states that he has no spots of concern )      HPI:  Mr. Carlito Wiseman is a(n) 46 year old male who presents today as a return patient for FBSE as a [facility] patient.    Last seen on 7/18/19 by Dr. Wilkinson at which time patient was started on triamcinolone 0.1% ointment twice daily for flare ups of mild nummular eczema. In addition, patient was started on clindamycin 1% solution for pseudofolliculitis barbae.    Today, the patient notes improvement in his pseudofolliculitis barbae and eczema and denies any new concerns. Patient uses CeraVe lotion for dry skin. When prompted, patients states his primary dermatologist has him use hydrocortisone, after shave cream and retinol  for pseudofolliculitis barbae.     Patient is otherwise feeling well, without additional skin concerns.    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     Labs Reviewed:  N/A    Physical Exam:  Vitals: There were no vitals taken for this visit.  SKIN: Total skin excluding the undergarment areas was performed. The exam included the head/face, neck, both arms, chest, back, abdomen, both legs, digits and/or nails.   - Skin is well moisturized with no scaly plaques.  - No papules, pustules on the face.  - There are waxy stuck on tan to brown papules on the trunk and extremities.  - No other lesions of concern on areas examined.     Medications:  Current Outpatient Medications   Medication     ascorbic acid 500 MG TABS     BENZOYL PEROXIDE WASH 5 % external liquid     cholecalciferol 25 MCG (1000 UT) TABS     clindamycin (CLEOCIN T) 1 % external lotion     fish oil-omega-3 fatty acids 1000 MG capsule     hydrocortisone 2.5 % ointment     Multiple Vitamins-Minerals (MENS MULTIVITAMIN PLUS) TABS     tretinoin (RETIN-A) 0.1 % external cream     triamcinolone (KENALOG) 0.1 % external ointment     No current facility-administered medications for this visit.        Past Medical History:   Patient Active Problem List   Diagnosis     CARDIOVASCULAR SCREENING; LDL GOAL LESS THAN 160     Low back pain     Neutropenia, unspecified type (H)     History of uveitis     Microscopic hematuria     S/P vasectomy     Status post reconstruction of anterior cruciate ligament     Pseudofolliculitis barbae     Nummular eczema     Past Medical History:   Diagnosis Date     Gastroesophageal reflux disease April 2015    Diet/work related. Changed both.     Uveitis     occured 3x since 2012 (07/19/19)        CC Referred Self, MD  No address on file on close of this encounter.

## 2021-07-28 ENCOUNTER — OFFICE VISIT (OUTPATIENT)
Dept: AUDIOLOGY | Facility: CLINIC | Age: 46
End: 2021-07-28

## 2021-07-28 ENCOUNTER — OFFICE VISIT (OUTPATIENT)
Dept: DERMATOLOGY | Facility: CLINIC | Age: 46
End: 2021-07-28
Payer: COMMERCIAL

## 2021-07-28 ENCOUNTER — TELEPHONE (OUTPATIENT)
Dept: GASTROENTEROLOGY | Facility: CLINIC | Age: 46
End: 2021-07-28

## 2021-07-28 ENCOUNTER — OFFICE VISIT (OUTPATIENT)
Dept: INTERNAL MEDICINE | Facility: CLINIC | Age: 46
End: 2021-07-28

## 2021-07-28 ENCOUNTER — LAB (OUTPATIENT)
Dept: LAB | Facility: CLINIC | Age: 46
End: 2021-07-28

## 2021-07-28 ENCOUNTER — ANCILLARY PROCEDURE (OUTPATIENT)
Dept: BONE DENSITY | Facility: CLINIC | Age: 46
End: 2021-07-28

## 2021-07-28 ENCOUNTER — APPOINTMENT (OUTPATIENT)
Dept: INTERNAL MEDICINE | Facility: CLINIC | Age: 46
End: 2021-07-28

## 2021-07-28 VITALS
OXYGEN SATURATION: 97 % | HEART RATE: 69 BPM | RESPIRATION RATE: 18 BRPM | BODY MASS INDEX: 30.52 KG/M2 | SYSTOLIC BLOOD PRESSURE: 134 MMHG | DIASTOLIC BLOOD PRESSURE: 87 MMHG | WEIGHT: 218 LBS | TEMPERATURE: 98.3 F | HEIGHT: 71 IN

## 2021-07-28 DIAGNOSIS — R06.83 SNORING: ICD-10-CM

## 2021-07-28 DIAGNOSIS — Z00.00 ENCOUNTER FOR PREVENTATIVE ADULT HEALTH CARE EXAMINATION: ICD-10-CM

## 2021-07-28 DIAGNOSIS — M54.9 BACK PAIN, UNSPECIFIED BACK LOCATION, UNSPECIFIED BACK PAIN LATERALITY, UNSPECIFIED CHRONICITY: ICD-10-CM

## 2021-07-28 DIAGNOSIS — Z15.89 HLA B27 (HLA B27 POSITIVE): ICD-10-CM

## 2021-07-28 DIAGNOSIS — E66.3 OVERWEIGHT: ICD-10-CM

## 2021-07-28 DIAGNOSIS — Z00.00 ENCOUNTER FOR PREVENTATIVE ADULT HEALTH CARE EXAMINATION: Primary | ICD-10-CM

## 2021-07-28 DIAGNOSIS — H20.9 UVEITIS: ICD-10-CM

## 2021-07-28 DIAGNOSIS — Z12.83 SKIN CANCER SCREENING: Primary | ICD-10-CM

## 2021-07-28 DIAGNOSIS — L82.1 SEBORRHEIC KERATOSIS: ICD-10-CM

## 2021-07-28 DIAGNOSIS — Z83.719 FAMILY HISTORY OF COLONIC POLYPS: ICD-10-CM

## 2021-07-28 DIAGNOSIS — E78.5 DYSLIPIDEMIA: ICD-10-CM

## 2021-07-28 DIAGNOSIS — Z87.2: ICD-10-CM

## 2021-07-28 DIAGNOSIS — R03.0 ELEVATED BLOOD PRESSURE READING WITHOUT DIAGNOSIS OF HYPERTENSION: ICD-10-CM

## 2021-07-28 LAB
ALBUMIN UR-MCNC: NEGATIVE MG/DL
ALP SERPL-CCNC: 64 U/L (ref 40–150)
ALT SERPL W P-5'-P-CCNC: 32 U/L (ref 0–70)
APPEARANCE UR: CLEAR
ATRIAL RATE - MUSE: 63 BPM
BASOPHILS # BLD AUTO: 0 10E3/UL (ref 0–0.2)
BASOPHILS NFR BLD AUTO: 1 %
BILIRUB UR QL STRIP: NEGATIVE
CHOLEST SERPL-MCNC: 204 MG/DL
COLOR UR AUTO: ABNORMAL
CREAT SERPL-MCNC: 1.04 MG/DL (ref 0.66–1.25)
DIASTOLIC BLOOD PRESSURE - MUSE: NORMAL MMHG
EOSINOPHIL # BLD AUTO: 0.1 10E3/UL (ref 0–0.7)
EOSINOPHIL NFR BLD AUTO: 2 %
ERYTHROCYTE [DISTWIDTH] IN BLOOD BY AUTOMATED COUNT: 13.5 % (ref 10–15)
FASTING STATUS PATIENT QL REPORTED: ABNORMAL
FASTING STATUS PATIENT QL REPORTED: NORMAL
GFR SERPL CREATININE-BSD FRML MDRD: 85 ML/MIN/1.73M2
GLUCOSE BLD-MCNC: 87 MG/DL (ref 70–99)
GLUCOSE UR STRIP-MCNC: NEGATIVE MG/DL
HCT VFR BLD AUTO: 47.1 % (ref 40–53)
HDLC SERPL-MCNC: 53 MG/DL
HGB BLD-MCNC: 15.1 G/DL (ref 13.3–17.7)
HGB UR QL STRIP: ABNORMAL
HOLD SPECIMEN: NORMAL
HOLD SPECIMEN: YES
IMM GRANULOCYTES # BLD: 0 10E3/UL
IMM GRANULOCYTES NFR BLD: 0 %
INTERPRETATION ECG - MUSE: NORMAL
KETONES UR STRIP-MCNC: NEGATIVE MG/DL
LDLC SERPL CALC-MCNC: 134 MG/DL
LEUKOCYTE ESTERASE UR QL STRIP: NEGATIVE
LYMPHOCYTES # BLD AUTO: 1.8 10E3/UL (ref 0.8–5.3)
LYMPHOCYTES NFR BLD AUTO: 47 %
MCH RBC QN AUTO: 29 PG (ref 26.5–33)
MCHC RBC AUTO-ENTMCNC: 32.1 G/DL (ref 31.5–36.5)
MCV RBC AUTO: 90 FL (ref 78–100)
MONOCYTES # BLD AUTO: 0.5 10E3/UL (ref 0–1.3)
MONOCYTES NFR BLD AUTO: 13 %
NEUTROPHILS # BLD AUTO: 1.4 10E3/UL (ref 1.6–8.3)
NEUTROPHILS NFR BLD AUTO: 37 %
NITRATE UR QL: NEGATIVE
NONHDLC SERPL-MCNC: 151 MG/DL
NRBC # BLD AUTO: 0 10E3/UL
NRBC BLD AUTO-RTO: 0 /100
P AXIS - MUSE: 47 DEGREES
PH UR STRIP: 6 [PH] (ref 5–7)
PLATELET # BLD AUTO: 252 10E3/UL (ref 150–450)
PR INTERVAL - MUSE: 160 MS
PSA SERPL-MCNC: 0.97 UG/L (ref 0–4)
QRS DURATION - MUSE: 94 MS
QT - MUSE: 424 MS
QTC - MUSE: 433 MS
R AXIS - MUSE: 40 DEGREES
RBC # BLD AUTO: 5.21 10E6/UL (ref 4.4–5.9)
RBC URINE: <1 /HPF
SP GR UR STRIP: 1.01 (ref 1–1.03)
SYSTOLIC BLOOD PRESSURE - MUSE: NORMAL MMHG
T AXIS - MUSE: 48 DEGREES
TRIGL SERPL-MCNC: 84 MG/DL
TSH SERPL DL<=0.005 MIU/L-ACNC: 1.3 MU/L (ref 0.4–4)
UROBILINOGEN UR STRIP-MCNC: NORMAL MG/DL
VENTRICULAR RATE- MUSE: 63 BPM
WBC # BLD AUTO: 3.9 10E3/UL (ref 4–11)
WBC URINE: 0 /HPF

## 2021-07-28 PROCEDURE — 99212 OFFICE O/P EST SF 10 MIN: CPT | Performed by: PHYSICIAN ASSISTANT

## 2021-07-28 PROCEDURE — 94375 RESPIRATORY FLOW VOLUME LOOP: CPT | Performed by: INTERNAL MEDICINE

## 2021-07-28 PROCEDURE — 86803 HEPATITIS C AB TEST: CPT | Mod: 90 | Performed by: PATHOLOGY

## 2021-07-28 PROCEDURE — 87389 HIV-1 AG W/HIV-1&-2 AB AG IA: CPT | Mod: 90 | Performed by: PATHOLOGY

## 2021-07-28 PROCEDURE — 81001 URINALYSIS AUTO W/SCOPE: CPT | Performed by: PATHOLOGY

## 2021-07-28 PROCEDURE — 99207 PR NO CHARGE LOS: CPT

## 2021-07-28 PROCEDURE — 82947 ASSAY GLUCOSE BLOOD QUANT: CPT | Performed by: PATHOLOGY

## 2021-07-28 PROCEDURE — 84443 ASSAY THYROID STIM HORMONE: CPT | Performed by: PATHOLOGY

## 2021-07-28 PROCEDURE — 93000 ELECTROCARDIOGRAM COMPLETE: CPT | Performed by: INTERNAL MEDICINE

## 2021-07-28 PROCEDURE — G0103 PSA SCREENING: HCPCS | Performed by: PATHOLOGY

## 2021-07-28 PROCEDURE — 77080 DXA BONE DENSITY AXIAL: CPT | Performed by: INTERNAL MEDICINE

## 2021-07-28 PROCEDURE — 99000 SPECIMEN HANDLING OFFICE-LAB: CPT | Performed by: PATHOLOGY

## 2021-07-28 PROCEDURE — 92553 AUDIOMETRY AIR & BONE: CPT | Performed by: AUDIOLOGIST

## 2021-07-28 PROCEDURE — 84460 ALANINE AMINO (ALT) (SGPT): CPT | Performed by: PATHOLOGY

## 2021-07-28 PROCEDURE — 99396 PREV VISIT EST AGE 40-64: CPT | Performed by: INTERNAL MEDICINE

## 2021-07-28 PROCEDURE — 82306 VITAMIN D 25 HYDROXY: CPT | Mod: 90 | Performed by: PATHOLOGY

## 2021-07-28 PROCEDURE — 96999 UNLISTED SPEC DERM SVC/PX: CPT | Performed by: INTERNAL MEDICINE

## 2021-07-28 PROCEDURE — 82565 ASSAY OF CREATININE: CPT | Performed by: PATHOLOGY

## 2021-07-28 PROCEDURE — 85025 COMPLETE CBC W/AUTO DIFF WBC: CPT | Performed by: PATHOLOGY

## 2021-07-28 PROCEDURE — 80061 LIPID PANEL: CPT | Performed by: PATHOLOGY

## 2021-07-28 PROCEDURE — 84075 ASSAY ALKALINE PHOSPHATASE: CPT | Performed by: PATHOLOGY

## 2021-07-28 PROCEDURE — 36415 COLL VENOUS BLD VENIPUNCTURE: CPT | Performed by: PATHOLOGY

## 2021-07-28 ASSESSMENT — ENCOUNTER SYMPTOMS
NAUSEA: 0
NECK PAIN: 0
RECTAL PAIN: 0
DIARRHEA: 0
VOMITING: 0
CONSTIPATION: 0
BACK PAIN: 1
HEARTBURN: 0
STIFFNESS: 1
ARTHRALGIAS: 0
MYALGIAS: 0
ABDOMINAL PAIN: 1
BLOOD IN STOOL: 0
JOINT SWELLING: 0
JAUNDICE: 0
BOWEL INCONTINENCE: 0
MUSCLE WEAKNESS: 0
MUSCLE CRAMPS: 0
BLOATING: 0

## 2021-07-28 ASSESSMENT — MIFFLIN-ST. JEOR: SCORE: 1884.93

## 2021-07-28 ASSESSMENT — PAIN SCALES - GENERAL
PAINLEVEL: NO PAIN (0)
PAINLEVEL: NO PAIN (0)

## 2021-07-28 NOTE — TELEPHONE ENCOUNTER
Screening Questions  1. Are you active on mychart?    2. What insurance is in the chart? [facility]    2.  Ordering/Referring Provider: Jim Ojeda MD    3. BMI 29.2    4. Are you on daily home oxygen? no    5. Do you have a history of difficult airway?  no    6. Have you had a heart, lung, or liver transplant?  no    7. Are you currently on dialysis?  no    8. Have you had a stroke or Transient ischemic atttack (TIA) within 6 months?  no    9. In the past 6 months, have you had any heart related issues including cardiomyopathy or heart attack?         If yes, did it require cardiac stenting or other implantable device? no    10. Do you have any implantable devices in your body (pacemaker, defib, LVAD)?  no    11. Do you take nitroglycerin? If yes, how often?  no    12. Are you currently taking any blood thinners? no    13. Are you a diabetic?  no    14. (Females) Are you currently pregnant?   If yes, how many weeks?    15. Have you had a procedure in the past that was difficult to tolerate with conscious sedation? Any allergies to Fentanyl or Versed  no    16. Are you taking any scheduled prescription narcotics more than once daily?  no    17. Do you have any chemical dependencies such as alcohol, street drugs, or methadone?  no    18. Do you have any history of post-traumatic stress syndrome or mental health issues?  no    19. Do you transfer independently?  yes    20.  Do you have any issues with constipation? no    21. Preferred Pharmacy for Pre Prescription  [facility] in [place] - on chart    Scheduling Details    Colonoscopy Prep Sent?: Miralax  Procedure Scheduled: Colon  Provider/Surgeon: Andrzej Conklin  Date of Procedure: 8/27/2021  Location: [facility]  Caller (Please ask for phone number if not scheduled by patient): Carlito Wiseman      Sedation Type: CS  Conscious Sedation- Needs  for 6 hours after the procedure  MAC/General-Needs  for 24 hours after procedure    Pre-op Required  at [facility], [facility] and OR for MAC sedation:   (if yes advise patient they will need a pre-op prior to procedure)      Is patient on blood thinners? -no (If yes- inform patient to follow up with PCP or provider for follow up instructions)     Informed patient they will need an adult  yes  Cannot take any type of public or medical transportation alone    Informed Patient of COVID Test Requirement yes    Confirmed Nurse will call to complete assessment yes    Additional comments: no

## 2021-07-28 NOTE — PROGRESS NOTES
AUDIOLOGY REPORT    SUMMARY: Audiology visit completed. See audiogram for results.      RECOMMENDATIONS: Follow up with Dr. Ojeda. If asymmetry worsens, referral to ENT may be warranted. Return to monitor hearing in 1 year, or sooner if concerns arise.    Erika Sanchez.  Licensed Audiologist  [place] # [id number]

## 2021-07-28 NOTE — NURSING NOTE
Chief Complaint   Patient presents with     Physical     Patient is for annual physical     Emily Lu CMA 7:49 AM on 7/28/2021.

## 2021-07-28 NOTE — NURSING NOTE
AHA BP    1st   135/83  2nd  138/81  3rd   134/87    Average  136/84  Emily Lu CMA 9:33 AM on 7/28/2021

## 2021-07-28 NOTE — LETTER
"7/28/2021      RE: Carlito Wiseman  [address]  [facility] [id number]       History and Physical Examination     SUBJECTIVE: Chief complaint: Carlito (pronounced \"ka-CONNIE-ooh\") presents for preventive health review.     Past Medical History:  1.  HLA-B27 (+)  2.  History of uveitis, diagnosed in 2009, 2011, 2017, and 2021; successfully treated with glucocorticoids.  3.  Status post arthroscopic right knee ACL reconstruction, 1991, following high school soccer injury  4.  History of partial left Achilles tear following pickup basketball injury, managed conservatively  5.  History of microscopic hematuria with negative CT in 2011; he does not recall having undergone cystoscopy.  6.  Status post vasectomy, 2015  7.  Impaired fasting glucose  8.  History of intermittent lower back pain, responsive to conservative measures; LS-spine x-rays in 2014 were normal; an MRI in 10/2017 showed disc bulging at L4-5 and L5-S1 with S1 nerve root compression and left greater than right neural foraminal narrowing.  9.  History of neutropenia, long-standing, with absolute neutrophil readings of 1700 in 2010, 1600 in 2012, 900 in 2014, 1060 in 2017, 1300 in 2019, and 2330 in 2020; history of normal hemoglobin and platelet levels.        Adverse Drug Reactions: None.     Current Medications:  Daily multivitamin supplement  Vitamin D, 50 mcg daily  Fish oil, 500 mg daily    Habits:  Tobacco: Never  Alcohol: 2 servings per week  Caffeine: 3-4 servings per day     Social History:  to Amanda and father of daughters Daniela, age 17, a rising senior at [employer] who enjoys softball, soccer, who plans to attend college in the east or southeast to study statistics, finance, or law; and Brigid, age 15, a rising sophomore at [employer] who enjoys track and tennis; and son Valentin, age 9, a survivor of neuroblastoma at age 2 months who enjoys reading, various sports, and collecting sports trading cards who will begin fourth grade at " "[employer] this .  Carlito was born in [place] and completed middle and high schools in [place], after which he attended [employer], where he studied on an [employer] scholarship.  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 in [place].  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  Away from work, he enjoys family activities, socializing, and travel.  He exercises most days of the week, working out 3 days per week with a  on strength and aerobic sessions, with occasional yoga sessions and high-intensity interval workouts with his wife at [facility].     Family History:  Mother is 70, with history of colonic polyps and breast cancer, diagnosed at age 59.  Father is 70, with history of colonic polyps and prostate cancer, diagnosed at age 60.  A sister 10 years his constance is in good health.  Son is a survivor of neuroblastoma.  Daughters are in good health.  Maternal grandfather  prematurely from an unspecified illness, with history of excessive tobacco and alcohol use.  Maternal grandmother  prematurely from lung cancer, with history of tobacco use.  Paternal grandfather  from tobacco-related lung cancer.  Paternal grandmother is in good health at age 97.     Review of Systems:  Carlito was treated for his fourth episode of uveitis in .  Regular snoring, without morning headaches, or unrefreshed sensation upon awakening; he at times notes mild daytime somnolence and his wife has noted occasional \"gasping\".  Occasional lower back stiffness  that typically responds to stretching; symptoms do not interfere with usual activities.  " "Episode of upper abdominal discomfort approximately one month ago, following 3 weeks of travel during which he did not exercise regularly and followed a \"poor\" diet that deviated from his usual healthful diet.  Following a fatty meal, he noted significant bloating and a protrusion in the right upper quadrant with dull discomfort rated at 1/10 in intensity.  Symptoms improved over several hours and had subsided upon awakening the following morning.  He noted no jaundice, scleral icterus, or change in appearance of urine or stools.  No history of colonoscopy.  Covid-19 (Pfizer) vaccinations administered in 4/2021 and 5/2021.  Most recent tetanus (Tdap) vaccination was administered in 12/2012.  Typhoid IM vaccination administered in 11/2013.  Medical records shows documentation of a single hepatitis A vaccination in 7/1998; Carlito believes that he completed a hepatitis A vaccination series while serving in [employer].  Yellow fever vaccination administered in 7/2006.  IPV administered in 5/1996.  MMR administered in 2/1996.  Remainder of complete review of systems was negative.       OBJECTIVE:     Vital signs: Height 71.25 inches.  Weight 218 pounds.  Blood pressure 134/87 on average of 3 automated readings.  Heart rate 69.  Respiratory rate 18.  Temperature 98.3 degrees.  O2 saturation 97% on room air.  General: Alert, neatly dressed and groomed, in no acute distress.  HEENT: Atraumatic and normocephalic. Eyelids, pupils, and conjunctivae appeared normal. Lips, teeth and gums appear normal.  Oropharynx showed moist mucous membranes, without exudate or erythema.  Relatively \"crowded\" soft palate with narrow airway.  Neck: Supple, without thyromegaly, mass, or bruit. No cervical or supraclavicular lymphadenopathy.  Large neck circumference.  Back: No spinal or costovertebral angle tenderness.  Chest: Clear to auscultation and percussion. Normal respiratory effort.  Cardiovascular: No jugular venous distention. Regular " rate and rhythm, normal S1, S2 without murmur.  Abdomen: Bowel sounds positive; soft, nontender, without rebound, guarding, hepatosplenomegaly or mass.  Extremities: No cyanosis or edema.  Genitalia: Normal male genitalia, without scrotal mass or hernia. No inguinal lymphadenopathy.  Rectal: Normal tone, with smooth, nontender, moderately enlarged prostate. No rectal mass.  Skin: Examination was deferred; full evaluation was completed later in day through dermatology clinic.  Neurologic: Cranial nerves II-XII were grossly intact. Sensory and motor examinations were normal. Normal gait.  Mini-cog score was 5/5.  Psychiatric: Alert and oriented ×3. Normal affect. Judgment and insight intact.  PHQ-2 score was 0.     Creatinine 1.04, alkaline phosphatase 64, ALT 32, cholesterol 204, HDL 53, , triglycerides 159, cholesterol/HDL 3.8, PSA 0.97, TSH 1.30, glucose 87, white blood cell count 3900, ANC 1400, hemoglobin 15.1, platelets 252,000; urinalysis showed small blood, with 1 rbc/hpf; 25-hydroxy vitamin D, HIV, and hepatitis C screens pending.     EKG was unremarkable.  Spirometry showed an FEV1 of 3.60, with an FVC of 4.21; readings were 93% and 86% of predicted values, respectively.     DEXA showed normal bone density, with most negative and valid Z-score of -0.3 at the level of the right femoral neck and right total hip.  Body composition analysis showed 26.7% fat (38th percentile); readings from 7/2019 showed 27.1% fat (39th percentile); body mass index was 29.2, unchanged from 7/201926.7.     ASSESSMENT:     1.  Family history of colonic polyps.  I again recommended screening colonoscopy, which he agrees to arrange.     2.  Narrow airway with history of snoring.  He notes occasional mild daytime somnolence and his wife has observed occasional gasping.  He agrees to schedule sleep clinic consultation to evaluate possible sleep apnea.     3.  Neutropenia.  Long-standing and stable (a 2020 reading that was higher  "than historical values was performed at an outside clinic); previous peripheral blood morphology was remarkable only for mild neutropenia.  In the setting of uveitis and (+) HLA-B27, I suspect an autoimmune component.  Plan will be continued monitoring without additional evaluation at this time.      4.  History of recurrent uveitis.  In the setting of positive testing for HLA-B27 in 2019, I recommended rheumatology consultation for discussion of options for prophylaxis.    5.  Dyslipidemia.  Using AHA/ACC guidelines, his estimated 10-year risk of a vascular event is 5.3% (optimal for his cohort is 3.5%), due in part to elevated blood pressure.  Plan will be conservative management given the 24, weight loss, and other measures to address elevated blood pressure.  We reviewed the elements of a healthful diet.  He was congratulated for his regimen of regular exercise and encouraged to modify type of exercise to facilitate weight loss.      6.  Elevated blood pressure.  Acceptable blood pressure on home readings.  We reviewed usual goals and non-pharmacologic measures to facilitate blood pressure reduction.    7.  Preventive care.  I again recommended screening colonoscopy, as noted above.  Immunization status is up-to-date.  I recommended continued daily use of a 50  g vitamin D3 supplement, and we reviewed the importance of selecting a multivitamin supplement that does not contain iron.  He was congratulated for his regimen of regular exercise and be advised to discuss with his  the option of subthreshold workouts for \"fat-burning\" effect and increased repetition with lower resistance strength training to reduce muscle bulk given weight and resume effect on blood pressure and vascular disease risk.       PLAN: See above.     ~SRT  Answers for HPI/ROS submitted by the patient on 7/28/2021  DARLENE 7 TOTAL SCORE: 0  General Symptoms: No  Skin Symptoms: No  HENT Symptoms: No  EYE SYMPTOMS: No  HEART SYMPTOMS: " No  LUNG SYMPTOMS: No  INTESTINAL SYMPTOMS: Yes  URINARY SYMPTOMS: No  REPRODUCTIVE SYMPTOMS: No  SKELETAL SYMPTOMS: Yes  BLOOD SYMPTOMS: No  NERVOUS SYSTEM SYMPTOMS: No  MENTAL HEALTH SYMPTOMS: No  Heart burn or indigestion: No  Nausea: No  Vomiting: No  Abdominal pain: Yes  Bloating: No  Constipation: No  Diarrhea: No  Blood in stool: No  Black stools: No  Rectal or Anal pain: No  Fecal incontinence: No  Yellowing of skin or eyes: No  Vomit with blood: No  Change in stools: No  Back pain: Yes  Muscle aches: No  Neck pain: No  Swollen joints: No  Joint pain: No  Bone pain: No  Muscle cramps: No  Muscle weakness: No  Joint stiffness: Yes  Bone fracture: No      Jim Ojeda MD

## 2021-07-28 NOTE — LETTER
7/28/2021       RE: Carlito Wiseman  [address]  [facility] [address]     Dear Colleague,    Thank you for referring your patient, Carlito Wiseman, to the [facility] [place] at [facility]. Please see a copy of my visit note below.    [facility] Dermatology Note  Encounter Date: Jul 28, 2021  Office Visit     Dermatology Problem List:  #. Skin Check 7/28/2021     ____________________________________________    Assessment & Plan:    #. Seborrheic keratoses  - Benign patient reassured.    #. History of pseudofolliculitis barbae  - Well controlled with topicals from outside dermatologist    #. Mild nummular eczema  - Well controlled with emollients.     Procedures Performed:   None     Follow-up: prn for new or changing lesions    Staff and Scribe:     Provider Disclosure:   The documentation recorded by the scribe accurately reflects the services I personally performed and the decisions made by me.    All risks, benefits and alternatives were discussed with patient.  Patient is in agreement and understands the assessment and plan.  All questions were answered.  Return to Clinic prn  Patricia Marte PA-C   [facility]     Scribe Disclosure:  I, Aimee Lora, am serving as a scribe to document services personally performed by Patricia Marte PA-C based on data collection and the provider's statements to me.   ____________________________________________    CC: Skin Check (carlito is coming in today for a skin check, states that he has no spots of concern )      HPI:  Mr. Carlito Wiseman is a(n) 46 year old male who presents today as a return patient for FBSE as a [facility] patient.    Last seen on 7/18/19 by Dr. Wilkinson at which time patient was started on triamcinolone 0.1% ointment twice daily for flare ups of mild nummular eczema. In addition, patient was started on  clindamycin 1% solution for pseudofolliculitis barbae.    Today, the patient notes improvement in his pseudofolliculitis barbae and eczema and denies any new concerns. Patient uses CeraVe lotion for dry skin. When prompted, patients states his primary dermatologist has him use hydrocortisone, after shave cream and retinol for pseudofolliculitis barbae.     Patient is otherwise feeling well, without additional skin concerns.    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     Labs Reviewed:  N/A    Physical Exam:  Vitals: There were no vitals taken for this visit.  SKIN: Total skin excluding the undergarment areas was performed. The exam included the head/face, neck, both arms, chest, back, abdomen, both legs, digits and/or nails.   - Skin is well moisturized with no scaly plaques.  - No papules, pustules on the face.  - There are waxy stuck on tan to brown papules on the trunk and extremities.  - No other lesions of concern on areas examined.     Medications:  Current Outpatient Medications   Medication     ascorbic acid 500 MG TABS     BENZOYL PEROXIDE WASH 5 % external liquid     cholecalciferol 25 MCG (1000 UT) TABS     clindamycin (CLEOCIN T) 1 % external lotion     fish oil-omega-3 fatty acids 1000 MG capsule     hydrocortisone 2.5 % ointment     Multiple Vitamins-Minerals (MENS MULTIVITAMIN PLUS) TABS     tretinoin (RETIN-A) 0.1 % external cream     triamcinolone (KENALOG) 0.1 % external ointment     No current facility-administered medications for this visit.        Past Medical History:   Patient Active Problem List   Diagnosis     CARDIOVASCULAR SCREENING; LDL GOAL LESS THAN 160     Low back pain     Neutropenia, unspecified type (H)     History of uveitis     Microscopic hematuria     S/P vasectomy     Status post reconstruction of anterior cruciate ligament     Pseudofolliculitis barbae     Nummular eczema     Past Medical History:   Diagnosis Date     Gastroesophageal reflux disease April 2015     Diet/work related. Changed both.     Uveitis     occured 3x since 2012 (07/19/19)        CC Referred Self, MD  No address on file on close of this encounter.

## 2021-07-28 NOTE — NURSING NOTE
Dermatology Rooming Note    Carlito Wiseman's goals for this visit include:   Chief Complaint   Patient presents with     Skin Check     carlito is coming in today for a skin check, states that he has no spots of concern      Radha Damon CMA on 7/28/2021 at 1:13 PM

## 2021-07-28 NOTE — PROGRESS NOTES
Carlito Wiseman comes into clinic today at the request of Dr. JENNIFER Ojeda Ordering Provider for EKG.    This service provided today was under the supervising provider of the day Dr. JENNIFER Ojeda, who was available if needed.    Emily Lu, [facility]

## 2021-07-28 NOTE — PROGRESS NOTES
"History and Physical Examination     SUBJECTIVE: Chief complaint: Carlito (pronounced \"ka-CONNIE-ooh\") presents for preventive health review.     Past Medical History:  1.  HLA-B27 (+)  2.  History of uveitis, diagnosed in 2009, 2011, 2017, and 2021; successfully treated with glucocorticoids.  3.  Status post arthroscopic right knee ACL reconstruction, 1991, following high school soccer injury  4.  History of partial left Achilles tear following pickup basketball injury, managed conservatively  5.  History of microscopic hematuria with negative CT in 2011; he does not recall having undergone cystoscopy.  6.  Status post vasectomy, 2015  7.  Impaired fasting glucose  8.  History of intermittent lower back pain, responsive to conservative measures; LS-spine x-rays in 2014 were normal; an MRI in 10/2017 showed disc bulging at L4-5 and L5-S1 with S1 nerve root compression and left greater than right neural foraminal narrowing.  9.  History of neutropenia, long-standing, with absolute neutrophil readings of 1700 in 2010, 1600 in 2012, 900 in 2014, 1060 in 2017, 1300 in 2019, and 2330 in 2020; history of normal hemoglobin and platelet levels.        Adverse Drug Reactions: None.     Current Medications:  Daily multivitamin supplement  Vitamin D, 50 mcg daily  Fish oil, 500 mg daily    Habits:  Tobacco: Never  Alcohol: 2 servings per week  Caffeine: 3-4 servings per day     Social History:  to Amanda and father of daughters Daniela, age 17, a rising senior at [employer] who enjoys softball, soccer, who plans to attend college in the east or southeast to study statistics, finance, or law; and Brigid, age 15, a rising sophomore at [employer] who enjoys track and tennis; and son Valentin, age 9, a survivor of neuroblastoma at age 2 months who enjoys reading, various sports, and collecting sports trading cards who will begin fourth grade at [employer] this fall.  Carlito was born in [place] and completed middle and high " "schools in [place], after which he attended [employer], where he studied on an [employer] scholarship.  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 in [place].  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  Away from work, he enjoys family activities, socializing, and travel.  He exercises most days of the week, working out 3 days per week with a  on strength and aerobic sessions, with occasional yoga sessions and high-intensity interval workouts with his wife at [employer].     Family History:  Mother is 70, with history of colonic polyps and breast cancer, diagnosed at age 59.  Father is 70, with history of colonic polyps and prostate cancer, diagnosed at age 60.  A sister 10 years his constance is in good health.  Son is a survivor of neuroblastoma.  Daughters are in good health.  Maternal grandfather  prematurely from an unspecified illness, with history of excessive tobacco and alcohol use.  Maternal grandmother  prematurely from lung cancer, with history of tobacco use.  Paternal grandfather  from tobacco-related lung cancer.  Paternal grandmother is in good health at age 97.     Review of Systems:  Carlito was treated for his fourth episode of uveitis in .  Regular snoring, without morning headaches, or unrefreshed sensation upon awakening; he at times notes mild daytime somnolence and his wife has noted occasional \"gasping\".  Occasional lower back stiffness  that typically responds to stretching; symptoms do not interfere with usual activities.  Episode of upper abdominal discomfort approximately one month ago, following 3 " "weeks of travel during which he did not exercise regularly and followed a \"poor\" diet that deviated from his usual healthful diet.  Following a fatty meal, he noted significant bloating and a protrusion in the right upper quadrant with dull discomfort rated at 1/10 in intensity.  Symptoms improved over several hours and had subsided upon awakening the following morning.  He noted no jaundice, scleral icterus, or change in appearance of urine or stools.  No history of colonoscopy.  Covid-19 (Pfizer) vaccinations administered in 4/2021 and 5/2021.  Most recent tetanus (Tdap) vaccination was administered in 12/2012.  Typhoid IM vaccination administered in 11/2013.  Medical records shows documentation of a single hepatitis A vaccination in 7/1998; Carlito believes that he completed a hepatitis A vaccination series while serving in [employer].  Yellow fever vaccination administered in 7/2006.  IPV administered in 5/1996.  MMR administered in 2/1996.  Remainder of complete review of systems was negative.       OBJECTIVE:     Vital signs: Height 71.25 inches.  Weight 218 pounds.  Blood pressure 134/87 on average of 3 automated readings.  Heart rate 69.  Respiratory rate 18.  Temperature 98.3 degrees.  O2 saturation 97% on room air.  General: Alert, neatly dressed and groomed, in no acute distress.  HEENT: Atraumatic and normocephalic. Eyelids, pupils, and conjunctivae appeared normal. Lips, teeth and gums appear normal.  Oropharynx showed moist mucous membranes, without exudate or erythema.  Relatively \"crowded\" soft palate with narrow airway.  Neck: Supple, without thyromegaly, mass, or bruit. No cervical or supraclavicular lymphadenopathy.  Large neck circumference.  Back: No spinal or costovertebral angle tenderness.  Chest: Clear to auscultation and percussion. Normal respiratory effort.  Cardiovascular: No jugular venous distention. Regular rate and rhythm, normal S1, S2 without murmur.  Abdomen: Bowel sounds positive; " soft, nontender, without rebound, guarding, hepatosplenomegaly or mass.  Extremities: No cyanosis or edema.  Genitalia: Normal male genitalia, without scrotal mass or hernia. No inguinal lymphadenopathy.  Rectal: Normal tone, with smooth, nontender, moderately enlarged prostate. No rectal mass.  Skin: Examination was deferred; full evaluation was completed later in day through dermatology clinic.  Neurologic: Cranial nerves II-XII were grossly intact. Sensory and motor examinations were normal. Normal gait.  Mini-cog score was 5/5.  Psychiatric: Alert and oriented ×3. Normal affect. Judgment and insight intact.  PHQ-2 score was 0.     Creatinine 1.04, alkaline phosphatase 64, ALT 32, cholesterol 204, HDL 53, , triglycerides 159, cholesterol/HDL 3.8, PSA 0.97, TSH 1.30, glucose 87, white blood cell count 3900, ANC 1400, hemoglobin 15.1, platelets 252,000; urinalysis showed small blood, with 1 rbc/hpf; 25-hydroxy vitamin D, HIV, and hepatitis C screens pending.     EKG was unremarkable.  Spirometry showed an FEV1 of 3.60, with an FVC of 4.21; readings were 93% and 86% of predicted values, respectively.     DEXA showed normal bone density, with most negative and valid Z-score of -0.3 at the level of the right femoral neck and right total hip.  Body composition analysis showed 26.7% fat (38th percentile); readings from 7/2019 showed 27.1% fat (39th percentile); body mass index was 29.2, unchanged from 7/201926.7.     ASSESSMENT:     1.  Family history of colonic polyps.  I again recommended screening colonoscopy, which he agrees to arrange.     2.  Narrow airway with history of snoring.  He notes occasional mild daytime somnolence and his wife has observed occasional gasping.  He agrees to schedule sleep clinic consultation to evaluate possible sleep apnea.     3.  Neutropenia.  Long-standing and stable (a 2020 reading that was higher than historical values was performed at an outside clinic); previous peripheral  "blood morphology was remarkable only for mild neutropenia.  In the setting of uveitis and (+) HLA-B27, I suspect an autoimmune component.  Plan will be continued monitoring without additional evaluation at this time.      4.  History of recurrent uveitis.  In the setting of positive testing for HLA-B27 in 2019, I recommended rheumatology consultation for discussion of options for prophylaxis.    5.  Dyslipidemia.  Using AHA/ACC guidelines, his estimated 10-year risk of a vascular event is 5.3% (optimal for his cohort is 3.5%), due in part to elevated blood pressure.  Plan will be conservative management given the 24, weight loss, and other measures to address elevated blood pressure.  We reviewed the elements of a healthful diet.  He was congratulated for his regimen of regular exercise and encouraged to modify type of exercise to facilitate weight loss.      6.  Elevated blood pressure.  Acceptable blood pressure on home readings.  We reviewed usual goals and non-pharmacologic measures to facilitate blood pressure reduction.    7.  Preventive care.  I again recommended screening colonoscopy, as noted above.  Immunization status is up-to-date.  I recommended continued daily use of a 50  g vitamin D3 supplement, and we reviewed the importance of selecting a multivitamin supplement that does not contain iron.  He was congratulated for his regimen of regular exercise and be advised to discuss with his  the option of subthreshold workouts for \"fat-burning\" effect and increased repetition with lower resistance strength training to reduce muscle bulk given weight and resume effect on blood pressure and vascular disease risk.       PLAN: See above.     ~SRT  Answers for HPI/ROS submitted by the patient on 7/28/2021  DARLENE 7 TOTAL SCORE: 0  General Symptoms: No  Skin Symptoms: No  HENT Symptoms: No  EYE SYMPTOMS: No  HEART SYMPTOMS: No  LUNG SYMPTOMS: No  INTESTINAL SYMPTOMS: Yes  URINARY SYMPTOMS: No  REPRODUCTIVE " SYMPTOMS: No  SKELETAL SYMPTOMS: Yes  BLOOD SYMPTOMS: No  NERVOUS SYSTEM SYMPTOMS: No  MENTAL HEALTH SYMPTOMS: No  Heart burn or indigestion: No  Nausea: No  Vomiting: No  Abdominal pain: Yes  Bloating: No  Constipation: No  Diarrhea: No  Blood in stool: No  Black stools: No  Rectal or Anal pain: No  Fecal incontinence: No  Yellowing of skin or eyes: No  Vomit with blood: No  Change in stools: No  Back pain: Yes  Muscle aches: No  Neck pain: No  Swollen joints: No  Joint pain: No  Bone pain: No  Muscle cramps: No  Muscle weakness: No  Joint stiffness: Yes  Bone fracture: No

## 2021-07-29 DIAGNOSIS — Z11.59 ENCOUNTER FOR SCREENING FOR OTHER VIRAL DISEASES: ICD-10-CM

## 2021-07-29 LAB
DEPRECATED CALCIDIOL+CALCIFEROL SERPL-MC: 42 UG/L (ref 20–75)
EXPTIME-PRE: 7.45 SEC
FEF2575-%PRED-PRE: 113 %
FEF2575-PRE: 4.08 L/SEC
FEF2575-PRED: 3.61 L/SEC
FEFMAX-%PRED-PRE: 103 %
FEFMAX-PRE: 10.52 L/SEC
FEFMAX-PRED: 10.2 L/SEC
FEV1-%PRED-PRE: 93 %
FEV1-PRE: 3.6 L
FEV1FEV6-PRE: 85 %
FEV1FEV6-PRED: 81 %
FEV1FVC-PRE: 86 %
FEV1FVC-PRED: 80 %
FIFMAX-PRE: 8.99 L/SEC
FVC-%PRED-PRE: 86 %
FVC-PRE: 4.21 L
FVC-PRED: 4.85 L
HCV AB SERPL QL IA: NONREACTIVE
HIV 1+2 AB+HIV1 P24 AG SERPL QL IA: NONREACTIVE

## 2021-08-19 ENCOUNTER — TELEPHONE (OUTPATIENT)
Dept: GASTROENTEROLOGY | Facility: CLINIC | Age: 46
End: 2021-08-19

## 2021-08-19 NOTE — TELEPHONE ENCOUNTER
Attempted to contact patient regarding upcoming colonocsopy procedure on 8.27.2021 for pre assessment questions. No answer.     Left message to return call to [phone] #[phone]    Covid test scheduled: 8.24.2021    Arrival time: 1130    Facility location: [facility]    Sedation type: CS    Bowel prep recommendation: miralax and magnesium citrate prep    Gertrude Conn RN

## 2021-08-20 NOTE — TELEPHONE ENCOUNTER
Writer reviewed pre-assessment questions with patient prior to upcoming colonoscopy on 8.27.2021.      Covid test scheduled: 8.24.2021    Arrival time: 1130    Facility location: [facility]    Sedation type: CS    Implantable devices? No    Blood thinners/Antiplatelet medication? No    Reviewed miralax and magnesium citrate prep instructions with patient.  Noting no nuts, seeds, or popcorn 7 days prior to procedure.     Designated  policy reviewed with patient.     Patient verbalized understanding.  No further questions or concerns.    Gertrude Conn RN

## 2021-08-24 ENCOUNTER — LAB (OUTPATIENT)
Dept: LAB | Facility: CLINIC | Age: 46
End: 2021-08-24
Payer: COMMERCIAL

## 2021-08-24 DIAGNOSIS — Z11.59 ENCOUNTER FOR SCREENING FOR OTHER VIRAL DISEASES: ICD-10-CM

## 2021-08-24 PROCEDURE — U0005 INFEC AGEN DETEC AMPLI PROBE: HCPCS

## 2021-08-24 PROCEDURE — U0003 INFECTIOUS AGENT DETECTION BY NUCLEIC ACID (DNA OR RNA); SEVERE ACUTE RESPIRATORY SYNDROME CORONAVIRUS 2 (SARS-COV-2) (CORONAVIRUS DISEASE [COVID-19]), AMPLIFIED PROBE TECHNIQUE, MAKING USE OF HIGH THROUGHPUT TECHNOLOGIES AS DESCRIBED BY CMS-2020-01-R: HCPCS

## 2021-08-25 LAB — SARS-COV-2 RNA RESP QL NAA+PROBE: NEGATIVE

## 2021-08-27 ENCOUNTER — HOSPITAL ENCOUNTER (OUTPATIENT)
Facility: AMBULATORY SURGERY CENTER | Age: 46
Discharge: HOME | End: 2021-08-27
Attending: INTERNAL MEDICINE | Admitting: INTERNAL MEDICINE
Payer: COMMERCIAL

## 2021-08-27 VITALS
SYSTOLIC BLOOD PRESSURE: 110 MMHG | TEMPERATURE: 97 F | HEIGHT: 72 IN | HEART RATE: 62 BPM | OXYGEN SATURATION: 98 % | DIASTOLIC BLOOD PRESSURE: 60 MMHG | RESPIRATION RATE: 16 BRPM | BODY MASS INDEX: 28.17 KG/M2 | WEIGHT: 208 LBS

## 2021-08-27 LAB — COLONOSCOPY: NORMAL

## 2021-08-27 PROCEDURE — 45378 DIAGNOSTIC COLONOSCOPY: CPT

## 2021-08-27 RX ORDER — FENTANYL CITRATE 50 UG/ML
INJECTION, SOLUTION INTRAMUSCULAR; INTRAVENOUS PRN
Status: DISCONTINUED | OUTPATIENT
Start: 2021-08-27 | End: 2021-08-27 | Stop reason: HOSPADM

## 2021-08-27 RX ORDER — ONDANSETRON 2 MG/ML
4 INJECTION INTRAMUSCULAR; INTRAVENOUS
Status: DISCONTINUED | OUTPATIENT
Start: 2021-08-27 | End: 2021-08-28 | Stop reason: HOSPADM

## 2021-08-27 RX ORDER — SODIUM CHLORIDE, SODIUM LACTATE, POTASSIUM CHLORIDE, CALCIUM CHLORIDE 600; 310; 30; 20 MG/100ML; MG/100ML; MG/100ML; MG/100ML
INJECTION, SOLUTION INTRAVENOUS CONTINUOUS
Status: DISCONTINUED | OUTPATIENT
Start: 2021-08-27 | End: 2021-08-28 | Stop reason: HOSPADM

## 2021-08-27 RX ORDER — LIDOCAINE 40 MG/G
CREAM TOPICAL
Status: DISCONTINUED | OUTPATIENT
Start: 2021-08-27 | End: 2021-08-28 | Stop reason: HOSPADM

## 2021-08-27 ASSESSMENT — MIFFLIN-ST. JEOR: SCORE: 1851.48

## 2021-10-02 ENCOUNTER — HEALTH MAINTENANCE LETTER (OUTPATIENT)
Age: 46
End: 2021-10-02

## 2021-10-29 ENCOUNTER — TRANSFERRED RECORDS (OUTPATIENT)
Dept: HEALTH INFORMATION MANAGEMENT | Facility: CLINIC | Age: 46
End: 2021-10-29
Payer: COMMERCIAL

## 2021-11-08 NOTE — PROGRESS NOTES
RHEUMATOLOGY INITIAL CONSULT NOTE    Chief Complaint:    Chief Complaint   Patient presents with     Consult     discuss positive uveitis on right side,back pain labs completed 7/28/2021-update care everywhere      Referral     referred by: Jim Ojeda MD       Reason for consult: Dr. Jim Ojeda from  has requested consultation for this patient for uveitis and HLA-B27 positivity     History of Present Illness:    Carlito Wiseman is a 46 year old male with pmhx R eye uveitis since 2009, GERD, nummular eczema, seborrheic keratoses, is referred to rheumatology clinic for HLA-B27 positivity and uveitis.     He reports onset of R eye uveitis in 2009, symptoms include redness and discomfort. His uveitis episodes have been treated with topical steroid drops. Since 2009, he has had 4 total episodes. Last episode was in Spring of 2021. He was seen by ophthalmology (Dr Johnson) in 2019. Was seen at [facility] in 2017 for uveitis, no underlying etiology was identified. His current eye doctor is Dr Aguilera at [facility].     He reports having low back pain and stiffness that started in 2011. He noticed the back pain first after a rigorous workout on concrete floor. He stopped rigorous workout similar to that but his back pain persisted. He has never had back pain with same severity as before. He gets chiropractic care frequently. Pain is located over the lumbar area. Occasionally he gets tingling down the RLE. Sometimes he gets buttock pain. He denies persistent pain or AM stiffness. Stretches makes his pain better. More often than not, he does not have back pain. Activity modification helps the back pain. Activity in general makes his back pain worse. When he gets stiffness in the back, it can last a couple days. He does not take anything for back pain if it does occur. He has hx of R ACL tear, which occasionally gets mildly swollen. He was seen by orthopedics and was recommended PT. He  has been working on PT and has been able to workout without any swelling. He denies any other peripheral joint pain or swelling. Denies any heel pain.    Denies fevers, chills, weight loss, night sweats, vision changes,  dry eyes, dry mouth, oral/nasal ulcers, rash, psoriasis, raynaud's, cough, SOB, pleurisy, chest pain, heartburn, abdominal pain, diarrhea, hematochezia, melena, dysuria, back pain, enthesitis, dactylitis, numbness, weakness, tingling. No hx of IBD. No hx of blood clots. No hx of GI/ infections or tick bites.     Pertinent labs and imaging: (per chart review in [facility] and care everywhere)    Past Medical History:    Past Medical History:   Diagnosis Date     Gastroesophageal reflux disease April 2015    Diet/work related. Changed both.     Uveitis     occured 3x since 2012 (07/19/19)     Past Surgical History:   Past Surgical History:   Procedure Laterality Date     COLONOSCOPY N/A 8/27/2021    Procedure: COLONOSCOPY;  Surgeon: Andrzej Conklin MD;  Location: [facility]     ORTHOPEDIC SURGERY  Age 15    Torn ACL     Family History:   Denies family hx of RA, SLE, Sjogren's syndrome, scleroderma, PsA, ankylosing spondylitis, psoriasis, vasculitis, gout, pseudogout    Social History:   Alcohol use: on average, 3 glasses of wine/week  Tobacco use: none  Occupational hx: works for [employer]   Sexually active with one partner    No Known Allergies   Immunization History   Administered Date(s) Administered     COVID-19,PF,Pfizer (12+ Yrs) 04/15/2021, 05/06/2021     FLU 6-35 months 12/05/2012, 11/15/2013, 11/26/2014     HepA-Adult 07/01/1998     Influenza (IIV3) PF 12/05/2012     Influenza Vaccine IM > 6 months Valent IIV4 (Alfuria,Fluzone) 12/05/2012, 11/15/2013, 11/26/2014     Influenza Vaccine IM Ages 6-35 Months 4 Valent (PF) 11/15/2013     Influenza Vaccine, 6+MO IM (QUADRIVALENT W/PRESERVATIVES) 12/05/2012     MMR 02/16/1996     Poliovirus, inactivated (IPV) 05/30/1996     TD (ADULT, 7+) 07/08/2006     TDAP  Vaccine (Boostrix) 12/05/2012     Tdap (Adacel,Boostrix) 12/05/2012     Typhoid IM 07/08/2006, 11/15/2013, 11/15/2013     Yellow Fever 07/08/2006     Medications:  Current Outpatient Medications   Medication Sig Dispense Refill     ascorbic acid 500 MG TABS        BENZOYL PEROXIDE WASH 5 % external liquid APPLY TO FACE AND THEN WASH OFF ONCE DAILY. MAY BLEACH CLOTHING. DISPENSE WASH FORM.       cholecalciferol 25 MCG (1000 UT) TABS Take 1,000 Units by mouth       clindamycin (CLEOCIN T) 1 % external lotion Apply topically daily For flare ups of beard bumps 60 mL 3     fish oil-omega-3 fatty acids 1000 MG capsule Take 1,000 mg by mouth       hydrocortisone 2.5 % ointment APPLY TO AFFECTED AREAS ON FACE AFTER SHAVING.       Multiple Vitamins-Minerals (MENS MULTIVITAMIN PLUS) TABS Take  by mouth.       tretinoin (RETIN-A) 0.1 % external cream PLEASE SEE ATTACHED FOR DETAILED DIRECTIONS       triamcinolone (KENALOG) 0.1 % external ointment Apply topically 2 times daily For flare ups of rash and dry skin on legs 80 g 3     PHYSICAL EXAMINATION:   Vital signs:  /87 (BP Location: Left arm, Patient Position: Sitting)   Pulse 63   Temp 98.7  F (37.1  C) (Oral)   Ht 1.829 m (6')   Wt 99.2 kg (218 lb 9.6 oz)   SpO2 99%   BMI 29.65 kg/m      MSK: no synovitis of joints, no joint effusions/warmth/erythema, no joint tenderness, no dactylitis, no enthesitis, ROM intact in all joints, able to make a fist b/l, no nail pitting, no SI joint tenderness, no pain elicited with internal/external ROM of b/l hips, pt able to touch toes without bending knees  Skin: no rashes  HEENT: MMM, no oral ulcers/lesions, no eye redness  CV: RRR  Pulm: CTAB, non-labored respirations, no c/r/w  Neuro: A&O x3, no focal deficits    Labs:      I have reviewed all pertinent investigations including labs, including outside records if relevant    HLA-B27  Recent Labs   Lab Test 07/18/19  1345   [id number] SSOP   B1 B27 Pos     CBC  Recent Labs    Lab Test 07/28/21  0808 07/18/19  0733   WBC 3.9* 3.5*   RBC 5.21 5.06   HGB 15.1 15.1   HCT 47.1 46.7   MCV 90 92   RDW 13.5 13.6    222   MCH 29.0 29.8   MCHC 32.1 32.3   NEUTROPHIL 37 35.7   LYMPH 47 46.0   MONOCYTE 13 12.4   EOSINOPHIL 2 5.1   BASOPHIL 1 0.8   ANEU  --  1.3*   ALYM  --  1.6   ENDY  --  0.4   AEOS  --  0.2   ABAS  --  0.0     CMP  Recent Labs   Lab Test 07/28/21  0811 07/18/19  0733   GLC 87 94   CR 1.04 0.96   GFRESTIMATED 85 >90   GFRESTBLACK  --  >90   ALKPHOS 64 72   ALT 32 29     Calcium/VitaminD  Recent Labs   Lab Test 07/28/21  0810 07/18/19  0733   VITDT 42 28     TSH/T4  Recent Labs   Lab Test 07/28/21  0809 07/18/19  0733   TSH 1.30 0.99     Lipid Panel  Recent Labs   Lab Test 07/28/21  0811 07/18/19  0733   CHOL 204* 214*   TRIG 84 159*   HDL 53 56   * 126*   NHDL 151* 158*     Hepatitis C  Recent Labs   Lab Test 07/28/21  0810   HCVAB Nonreactive     Lyme ab screening  Recent Labs   Lab Test 07/18/19  0733   LYMEGM 0.10     HIV Screening  Recent Labs   Lab Test 07/28/21  0810 07/18/19  0733   HIAGAB Nonreactive Nonreactive     UA  Recent Labs   Lab Test 07/28/21  0803 07/18/19  0751   COLOR Straw Yellow   APPEARANCE Clear Clear   URINEGLC Negative Negative   URINEBILI Negative Negative   SG 1.009 1.013   URINEPH 6.0 6.0   PROTEIN Negative Negative   NITRITE Negative Negative   UBLD Small* Small*   LEUKEST Negative Negative   WBCU 0 <1   RBCU <1 1   MUCOUS  --  Present*     Urine Microscopic  Recent Labs   Lab Test 07/28/21  0803 07/18/19  0751   WBCU 0 <1   RBCU <1 1   MUCOUS  --  Present*     Imaging:     I have reviewed all pertinent investigations including imaging, including outside records if relevant    MR R knee (10/19/2020)  IMPRESSION:     1.  Degenerative arthrosis most pronounced at the patellofemoral compartment. Cartilage loss involving the median ridge, medial facet and femoral trochlea.   2.  Edema in the superior medial aspect of Hoffa's fat. The  appearance suggests fat pad impingement.   3.  At least degeneration in the body and posterior horn of the medial meniscus. Some images suggest a developing oblique horizontal cleavage plane in the posterior horn. Discrete tear is not yet visible.   4.  Moderate joint effusion.     Assessment / Plan:    Carlito Wiseman is a 46 year old male with pmhx R eye uveitis since 2009, GERD, nummular eczema, seborrheic keratoses, is referred to rheumatology clinic for HLA-B27 positivity and uveitis. Any prior notes, outside records, laboratory results, and imaging studies were reviewed if relevant. Pertinent work-up thus far includes negative RPR, lyme, HIV, +HLA-B27, and negative Hep C. Hx of R eye uveitis since 2009 with total of 4 episodes thus far, most recent episode in Spring 2021. Uveitis episodes have been treated with topical steroid drops with good improvement. No hx of psoriasis, no active lesions. Hx of R knee effusion related to ACL tear -denies any warmth/redness during episode of swelling. R knee swelling resolved with PT. Back pain does not have inflammatory features and only bothers him infrequently. Back pain is associated with activity and improves with activity modification. He does not have any psoriasis, IBD, dactylitis, enthesitis, SI joint tenderness, joint effusions, nail pitting or decreased ROM. No s/s to suggest vasculitis. No s/s of autoimmune CTD. I will evaluate for seronegative SpA and sarcoidosis. We discussed obtaining SI joint and L spine plain films to evaluate for inflammatory changes.     1) Hx of R eye uveitis  -currently sees Dr Richardson at [facility]. Recommend continued management by ophthalmology; if they feel there is indication for systemic therapy, I am happy to co-manage   -obtain plain films of SI joints and L spine to evaluate for inflammatory arthritis  -obtain CXR to evaluate for NITESH  -check TB quant  -request eye clinic records      Mr. Wiseman verbalized  agreement with and understanding of the rationale for the diagnosis and treatment plan.  All questions were answered to best of my ability and the patient's satisfaction. Mr. Wiseman was advised to contact the clinic with any questions that may arise after the clinic visit.          Chart documentation done in part with Dragon Voice recognition Software. Although reviewed after completion, some word and grammatical error may remain.      RTC pending test results    Tiara Leslie MD

## 2021-11-09 NOTE — NURSING NOTE
Carlito Wiseman's goals for this visit include:   Chief Complaint   Patient presents with     Consult     discuss positive uveitis on right side,back pain labs completed 7/28/2021-update care everywhere      Referral     referred by: Jim Ojeda MD       PCP: Clinic, [facility]    Referring Provider:  Jim Ojeda MD  [address] [place] [address] [id number]  [place] [address]      Initial /87 (BP Location: Left arm, Patient Position: Sitting)   Pulse 63   Temp 98.7  F (37.1  C) (Oral)   Ht 1.829 m (6')   Wt 99.2 kg (218 lb 9.6 oz)   SpO2 99%   BMI 29.65 kg/m   Estimated body mass index is 29.65 kg/m  as calculated from the following:    Height as of this encounter: 1.829 m (6').    Weight as of this encounter: 99.2 kg (218 lb 9.6 oz).    Medication Reconciliation: complete    Do you need any medication refills at today's visit? none      CANDACE [facility]

## 2021-11-11 ENCOUNTER — OFFICE VISIT (OUTPATIENT)
Dept: RHEUMATOLOGY | Facility: CLINIC | Age: 46
End: 2021-11-11
Attending: INTERNAL MEDICINE
Payer: COMMERCIAL

## 2021-11-11 ENCOUNTER — ANCILLARY PROCEDURE (OUTPATIENT)
Dept: GENERAL RADIOLOGY | Facility: CLINIC | Age: 46
End: 2021-11-11
Attending: STUDENT IN AN ORGANIZED HEALTH CARE EDUCATION/TRAINING PROGRAM
Payer: COMMERCIAL

## 2021-11-11 VITALS
DIASTOLIC BLOOD PRESSURE: 87 MMHG | HEART RATE: 63 BPM | HEIGHT: 72 IN | SYSTOLIC BLOOD PRESSURE: 136 MMHG | WEIGHT: 218.6 LBS | BODY MASS INDEX: 29.61 KG/M2 | TEMPERATURE: 98.7 F | OXYGEN SATURATION: 99 %

## 2021-11-11 DIAGNOSIS — H20.9 UVEITIS: ICD-10-CM

## 2021-11-11 DIAGNOSIS — M54.9 BACK PAIN, UNSPECIFIED BACK LOCATION, UNSPECIFIED BACK PAIN LATERALITY, UNSPECIFIED CHRONICITY: ICD-10-CM

## 2021-11-11 DIAGNOSIS — Z15.89 HLA B27 (HLA B27 POSITIVE): ICD-10-CM

## 2021-11-11 PROCEDURE — 86481 TB AG RESPONSE T-CELL SUSP: CPT | Performed by: STUDENT IN AN ORGANIZED HEALTH CARE EDUCATION/TRAINING PROGRAM

## 2021-11-11 PROCEDURE — 72100 X-RAY EXAM L-S SPINE 2/3 VWS: CPT | Performed by: RADIOLOGY

## 2021-11-11 PROCEDURE — 99204 OFFICE O/P NEW MOD 45 MIN: CPT | Performed by: STUDENT IN AN ORGANIZED HEALTH CARE EDUCATION/TRAINING PROGRAM

## 2021-11-11 PROCEDURE — 72202 X-RAY EXAM SI JOINTS 3/> VWS: CPT | Performed by: RADIOLOGY

## 2021-11-11 PROCEDURE — 71046 X-RAY EXAM CHEST 2 VIEWS: CPT | Mod: GC | Performed by: RADIOLOGY

## 2021-11-11 PROCEDURE — 36415 COLL VENOUS BLD VENIPUNCTURE: CPT | Performed by: STUDENT IN AN ORGANIZED HEALTH CARE EDUCATION/TRAINING PROGRAM

## 2021-11-11 ASSESSMENT — MIFFLIN-ST. JEOR: SCORE: 1899.56

## 2021-11-11 NOTE — NURSING NOTE
[facility] for [facility] faxed to [phone]. Confirmed successful via right fax.      CANDACE [facility]

## 2021-11-11 NOTE — PATIENT INSTRUCTIONS
1. Please get labs and x-rays today  2. Pending results, we will consider whether you need any further testing  3. Continue to follow with your eye doctor. If they feel you need any systemic treatment, I am happy to help co-manage  4. Follow-up pending test results

## 2021-11-11 NOTE — LETTER
November 12, 2021      Carlito Wiseman  [address]  [facility] [address]        Dear ,    We are writing to inform you of your test results.    It was a pleasure meeting you. X-rays of sacroiliac joints are negative for inflammatory changes. X-ray of L spine showed some degenerative changes. Chest x-ray is negative. These results are reassuring from rheumatology standpoint. Recommend continued follow-up with ophthalmology for management of uveitis.       Please let us know if you have any questions or concerns.       Regards,   Tiara Leslie MD       Resulted Orders   XR Sacroiliac Joint G/E 3 Views    Narrative    EXAM: XR SACROILIAC JOINT G/E 3 VIEWS  11/11/2021 10:02 AM      HISTORY: Evaluate for Sacroilitis or Sclerosis; HLA B27 (HLA B27  positive); Uveitis; Back pain, unspecified back location, unspecified  back pain laterality, unspecified chronicity    COMPARISON: None    FINDINGS: AP and bilateral oblique views of the sacroiliac joints.    No evidence of inflammatory sacroiliitis. No erosion or ankylosis. No  substantial degenerative change of the hips.       Impression    IMPRESSION: No radiographic evidence of inflammatory sacroiliitis.         ARIA OLEA MD (Joe)         SYSTEM ID:  [id number]

## 2021-11-12 LAB
GAMMA INTERFERON BACKGROUND BLD IA-ACNC: 0.03 IU/ML
M TB IFN-G BLD-IMP: NEGATIVE
M TB IFN-G CD4+ BCKGRND COR BLD-ACNC: 9.97 IU/ML
MITOGEN IGNF BCKGRD COR BLD-ACNC: 0.07 IU/ML
MITOGEN IGNF BCKGRD COR BLD-ACNC: 0.08 IU/ML
QUANTIFERON MITOGEN: 10 IU/ML
QUANTIFERON NIL TUBE: 0.03 IU/ML
QUANTIFERON TB1 TUBE: 0.11 IU/ML
QUANTIFERON TB2 TUBE: 0.1

## 2021-11-12 NOTE — RESULT ENCOUNTER NOTE
Dear Carlito,     It was a pleasure meeting you. X-rays of sacroiliac joints are negative for inflammatory changes. X-ray of L spine showed some degenerative changes. Chest x-ray is negative. These results are reassuring from rheumatology standpoint. Recommend continued follow-up with ophthalmology for management of uveitis.     Please let us know if you have any questions or concerns.    Regards,  Tiara Leslie MD

## 2021-11-17 NOTE — RESULT ENCOUNTER NOTE
Dear Carlito,     TB test is negative.    Please let us know if you have any questions or concerns.    Regards,  Tiara Leslie MD

## 2022-05-24 ENCOUNTER — TRANSFERRED RECORDS (OUTPATIENT)
Dept: HEALTH INFORMATION MANAGEMENT | Facility: CLINIC | Age: 47
End: 2022-05-24
Payer: COMMERCIAL

## 2022-05-27 ENCOUNTER — TRANSCRIBE ORDERS (OUTPATIENT)
Dept: OTHER | Age: 47
End: 2022-05-27

## 2022-05-27 DIAGNOSIS — H50.331 INTERMITTENT MONOCULAR EXOTROPIA, RIGHT EYE: Primary | ICD-10-CM

## 2022-05-27 DIAGNOSIS — H05.20 UNSPECIFIED EXOPHTHALMOS: ICD-10-CM

## 2022-06-02 NOTE — TELEPHONE ENCOUNTER
FUTURE VISIT INFORMATION      FUTURE VISIT INFORMATION:    Date: 6/22/2022    Time: 8:30 AM    Location: [facility]  REFERRAL INFORMATION:    Referring provider: Britni Bradley, OD    Referring providers clinic:  [facility]    Reason for visit/diagnosis: v/t/d, intermittent monocular exotropia right eye    RECORDS REQUESTED FROM:       Clinic name Comments Records Status Imaging Status   [facility]  Phone: [phone]  Fax: [phone]  6/2 Req    ealth 7/18/19 - EYE OV with Dr. Johnson Epic                              * 6/2/22 11:27 AM Faxed req to [facility] for records to be faxed to the clinic. - Abril

## 2022-06-08 ENCOUNTER — MEDICAL CORRESPONDENCE (OUTPATIENT)
Dept: HEALTH INFORMATION MANAGEMENT | Facility: CLINIC | Age: 47
End: 2022-06-08
Payer: COMMERCIAL

## 2022-06-08 NOTE — TELEPHONE ENCOUNTER
Resent requested to [facility] 6/8/22  [facility] records scanned into chart under 10/29/21- [facility]    Received recs from [facility] and sent to [facility]

## 2022-06-22 ENCOUNTER — OFFICE VISIT (OUTPATIENT)
Dept: OPHTHALMOLOGY | Facility: CLINIC | Age: 47
End: 2022-06-22
Payer: COMMERCIAL

## 2022-06-22 ENCOUNTER — PRE VISIT (OUTPATIENT)
Dept: OPHTHALMOLOGY | Facility: CLINIC | Age: 47
End: 2022-06-22

## 2022-06-22 DIAGNOSIS — H53.10 SUBJECTIVE VISUAL DISTURBANCE: Primary | ICD-10-CM

## 2022-06-22 DIAGNOSIS — H50.331 INTERMITTENT MONOCULAR EXOTROPIA, RIGHT EYE: ICD-10-CM

## 2022-06-22 DIAGNOSIS — H05.20 UNSPECIFIED EXOPHTHALMOS: ICD-10-CM

## 2022-06-22 DIAGNOSIS — H50.15 ALTERNATING EXOTROPIA: Primary | ICD-10-CM

## 2022-06-22 PROCEDURE — 92014 COMPRE OPH EXAM EST PT 1/>: CPT | Performed by: OPHTHALMOLOGY

## 2022-06-22 PROCEDURE — 92060 SENSORIMOTOR EXAMINATION: CPT | Performed by: OPHTHALMOLOGY

## 2022-06-22 ASSESSMENT — VISUAL ACUITY
CORRECTION_TYPE: GLASSES
OD_CC: 20/30
OS_CC: 20/20
METHOD: SNELLEN - LINEAR
OD_CC+: -2

## 2022-06-22 ASSESSMENT — SLIT LAMP EXAM - LIDS
COMMENTS: NORMAL
COMMENTS: NORMAL

## 2022-06-22 ASSESSMENT — MARGIN REFLEX DISTANCE
OS_MRD1: 4
OD_MRD1: 4

## 2022-06-22 ASSESSMENT — REFRACTION_WEARINGRX
OS_SPHERE: -0.75
SPECS_TYPE: SVL
OD_AXIS: 089
OS_CYLINDER: +1.25
OS_AXIS: 120
OD_SPHERE: -5.75
OD_CYLINDER: +3.00

## 2022-06-22 ASSESSMENT — TONOMETRY
OS_IOP_MMHG: 13
OD_IOP_MMHG: 13
IOP_METHOD: TONOPEN

## 2022-06-22 ASSESSMENT — EXTERNAL EXAM - RIGHT EYE: OD_EXAM: NORMAL

## 2022-06-22 ASSESSMENT — CONF VISUAL FIELD
OS_NORMAL: 1
METHOD: COUNTING FINGERS
OD_NORMAL: 1

## 2022-06-22 ASSESSMENT — CUP TO DISC RATIO
OD_RATIO: 0.3
OS_RATIO: 0.3

## 2022-06-22 ASSESSMENT — EXTERNAL EXAM - LEFT EYE: OS_EXAM: NORMAL

## 2022-06-22 NOTE — NURSING NOTE
Chief Complaint(s) and History of Present Illness(es)     Diplopia Evaluation     In both eyes.  Characterized as horizontal.  Duration of 2 months.  Occurring intermittently.  Occurring at random times.  Associated symptoms include blurred vision.  Negative for eye pain and headaches.  Treatments tried include patching.  Pain was noted as 0/10.              Comments     1.  X(T)  2. Intermittent double vision    Vision in the right eye has always been worse than the left eye.  Hx of amblyopia right eye, patching but was unsuccessful.   He reports back in 12/2021, wife and daughter has commented on the right eye drifting.  He also noticed it himself later.    He reports of intermittent double vision, he notices it mainly when he has his glasses off.   He was seen at [facility] and was told he had RXT and told surgery could help correct it.  He went for a second opinion [facility], was told he could have corrective surgery for his vision but wanted to make sure his strabismus was stable before proceeding.       AJ Peña 6/22/2022 8:40 AM

## 2022-06-22 NOTE — LETTER
2022         RE:  :  MRN: Carlito Wiseman  1975  [id number]     Dear Dr. Bradley,    Thank you for asking me to see your very pleasant patient, Carlito Wiseman, in neuro-ophthalmic consultation.  I would like to thank you for sending your records and I have summarized them in the history of present illness.  My assessment and plan are below.  For further details, please see my attached clinic note.      Assessment & Plan     Carlito Wiseman is a 46 year old male with the following diagnoses:   1. Alternating exotropia    2. Intermittent monocular exotropia, right eye    3. Unspecified exophthalmos         Patient was sent for consultation by Dr. Britni Bradley for intermittent exotropia and proptosis.     HPI:  History of uveitis in the RIGHT eye x 4 times over 10 years or so.  He has had a work-up on a couple of occasions without a clear cause.  He has noted some RIGHT eye drifting for the past year or so. His wife has noted it and he has seen it in the mirror.  He has had reduced vision in the RIGHT eye for his whole life.  He mostly notices it with his glasses off. He was patched as a child. He is not sure if the vision is worse in the RIGHT eye from the uveitis.  He doesn't think that his eyes are bulging per side effects. He denies closing an eye when goes into the sunlight. He has experienced double vision once in 2022. He sometimes feels like car on his right is coming into his oscar, when they are not. He denies ptosis.  He denies headache.      Independent historians:  Patient    Review of outside clinical notes:  Dr. Britni Bradley clinic notes     Past medical history:  None    Medications:   Benzoyl Peroxide Wash Liqd  cholecalciferol Tabs  clindamycin  fish oil-omega-3 fatty acids  hydrocortisone  Mens Multivitamin Plus Tabs  tretinoin  triamcinolone  vitamin C    Family history / social history:  Denies smoker   He drinks about once a week    of [place]     Exam:  His visual acuity  is 20/30 right eye 20/20 left eye.  He has significant anisometropia.  He has a relatively comitant 14-16 prism diopter exotropia in all fields of gaze with full extraocular motility.  There is no slowing of the saccades.  There is no ptosis.  There is mild eyelid retraction bilaterally.  His Chepe exophthalmometry is 24 mm in both eyes but he states that his eyes have always been prominent and that he has not noticed any change.    Tests ordered and interpreted today:  Sensorimotor exam     Discussion of management / interpretation with another provider:   None    Assessment/Plan:   It is my impression that patient has an intermittent exotropia.  He has full eye movements and it is comitant.  This would be suggestive of a breakdown of a pre-existing misalignment.  When I show him a 15 prism diopter base in prism he does not develop double vision.  He is very interested in strabismus surgery.  We will make arrangements for an evaluation.            Again, thank you for allowing me to participate in the care of your patient.      Sincerely,    Alexander Henning MD  Professor  Ophthalmology Residency   Director of Neuro-Ophthalmology  [facility] Endowed Chair  Departments of Ophthalmology, Neurology, and Neurosurgery  [address]  [address]  T - [phone]  F - [phone]  [email or web address]      CC: [facility] [place] [facility]  [address] [place] [address]  Via Fax: [phone]     Britni Bradley OD  [facility]  [address]  [facility] [address]  Via Fax: [phone]    DX = exotropia

## 2022-06-22 NOTE — PROGRESS NOTES
Assessment & Plan     Carlito Wiseman is a 46 year old male with the following diagnoses:   1. Alternating exotropia    2. Intermittent monocular exotropia, right eye    3. Unspecified exophthalmos         Patient was sent for consultation by Dr. Britni Bradley for intermittent exotropia and proptosis.     HPI:  History of uveitis in the RIGHT eye x 4 times over 10 years or so.  He has had a work-up on a couple of occasions without a clear cause.  He has noted some RIGHT eye drifting for the past year or so. His wife has noted it and he has seen it in the mirror.  He has had reduced vision in the RIGHT eye for his whole life.  He mostly notices it with his glasses off. He was patched as a child. He is not sure if the vision is worse in the RIGHT eye from the uveitis.  He doesn't think that his eyes are bulging per side effects. He denies closing an eye when goes into the sunlight. He has experienced double vision once in April 2022. He sometimes feels like car on his right is coming into his oscar, when they are not. He denies ptosis.  He denies headache.      Independent historians:  Patient    Review of outside clinical notes:  Dr. Britni Bradley clinic notes     Past medical history:  None    Medications:   Benzoyl Peroxide Wash Liqd  cholecalciferol Tabs  clindamycin  fish oil-omega-3 fatty acids  hydrocortisone  Mens Multivitamin Plus Tabs  tretinoin  triamcinolone  vitamin C    Family history / social history:  Denies smoker   He drinks about once a week    of [facility]     Exam:  His visual acuity is 20/30 right eye 20/20 left eye.  He has significant anisometropia.  He has a relatively comitant 14-16 prism diopter exotropia in all fields of gaze with full extraocular motility.  There is no slowing of the saccades.  There is no ptosis.  There is mild eyelid retraction bilaterally.  His Chepe exophthalmometry is 24 mm in both eyes but he states that his eyes have always been prominent and that he has not  noticed any change.    Tests ordered and interpreted today:  Sensorimotor exam     Discussion of management / interpretation with another provider:   None    Assessment/Plan:   It is my impression that patient has an intermittent exotropia.  He has full eye movements and it is comitant.  This would be suggestive of a breakdown of a pre-existing misalignment.  When I show him a 15 prism diopter base in prism he does not develop double vision.  He is very interested in strabismus surgery.  We will make arrangements for an evaluation.             Attending Physician Attestation:  Complete documentation of historical and exam elements from today's encounter can be found in the full encounter summary report (not reduplicated in this progress note).  I personally obtained the chief complaint(s) and history of present illness.  I confirmed and edited as necessary the review of systems, past medical/surgical history, family history, social history, and examination findings as documented by others; and I examined the patient myself.  I personally reviewed the relevant tests, images, and reports as documented above.  I formulated and edited as necessary the assessment and plan and discussed the findings and management plan with the patient and family. - Alexander Henning MD

## 2022-06-22 NOTE — Clinical Note
6/22/2022       RE: Carlito Wiseman  [address]  [facility] [address]     Dear Colleague,    Thank you for referring your patient, Carlito Wiseman, to the [facility]. Please see a copy of my visit note below.         Assessment & Plan     Carlito Wiseman is a 46 year old male with the following diagnoses:   1. Alternating exotropia    2. Intermittent monocular exotropia, right eye    3. Unspecified exophthalmos         Patient was sent for consultation by Dr. Britni Bradley for intermittent exotropia and proptosis.     HPI:  History of uveitis in the RIGHT eye x 4 times over 10 years or so.  He has had a work-up on a couple of occasions without a clear cause.  He has noted some RIGHT eye drifting for the past year or so. His wife has noted it and he has seen it in the mirror.  He has had reduced vision in the RIGHT eye for his whole life.  He mostly notices it with his glasses off. He was patched as a child. He is not sure if the vision is worse in the RIGHT eye from the uveitis.  He doesn't think that his eyes are bulging per side effects. He denies closing an eye when goes into the sunlight. He has experienced double vision once in April 2022. He sometimes feels like car on his right is coming into his oscar, when they are not. He denies ptosis.  He denies headache.      Independent historians:  Patient    Review of outside clinical notes:  Dr. Britni Bradley clinic notes     Past medical history:  None    Medications:   Benzoyl Peroxide Wash Liqd  cholecalciferol Tabs  clindamycin  fish oil-omega-3 fatty acids  hydrocortisone  Mens Multivitamin Plus Tabs  tretinoin  triamcinolone  vitamin C    Family history / social history:  Denies smoker   He drinks about once a week    of [place]     Exam:  His visual acuity is 20/30 right eye 20/20 left eye.  He has significant anisometropia.  He has a relatively comitant 14-16  prism diopter exotropia in all fields of gaze with full extraocular motility.  There is no slowing of the saccades.  There is no ptosis.  There is mild eyelid retraction bilaterally.  His Chepe exophthalmometry is 24 mm in both eyes but he states that his eyes have always been prominent and that he has not noticed any change.    Tests ordered and interpreted today:  Sensorimotor exam     Discussion of management / interpretation with another provider:   None    Assessment/Plan:   It is my impression that patient has an intermittent exotropia.  He has full eye movements and it is comitant.  This would be suggestive of a breakdown of a pre-existing misalignment.  When I show him a 15 prism diopter base in prism he does not develop double vision.  He is very interested in strabismus surgery.  We will make arrangements for an evaluation.             Attending Physician Attestation:  Complete documentation of historical and exam elements from today's encounter can be found in the full encounter summary report (not reduplicated in this progress note).  I personally obtained the chief complaint(s) and history of present illness.  I confirmed and edited as necessary the review of systems, past medical/surgical history, family history, social history, and examination findings as documented by others; and I examined the patient myself.  I personally reviewed the relevant tests, images, and reports as documented above.  I formulated and edited as necessary the assessment and plan and discussed the findings and management plan with the patient and family. - Alexander Henning MD              Again, thank you for allowing me to participate in the care of your patient.      Sincerely,    Alexander Henning MD

## 2022-09-03 ENCOUNTER — HEALTH MAINTENANCE LETTER (OUTPATIENT)
Age: 47
End: 2022-09-03

## 2023-07-22 ENCOUNTER — HEALTH MAINTENANCE LETTER (OUTPATIENT)
Age: 48
End: 2023-07-22

## 2024-09-14 ENCOUNTER — HEALTH MAINTENANCE LETTER (OUTPATIENT)
Age: 49
End: 2024-09-14

## (undated) DEVICE — GOWN IMPERVIOUS 2XL BLUE

## (undated) DEVICE — KIT ENDO TURNOVER/PROCEDURE CARRY-ON 101822

## (undated) DEVICE — SOL WATER IRRIG 500ML BOTTLE 2F7113

## (undated) DEVICE — TUBING SUCTION 12"X1/4" N612

## (undated) DEVICE — SPECIMEN CONTAINER 3OZ W/FORMALIN 59901

## (undated) DEVICE — SUCTION MANIFOLD NEPTUNE 2 SYS 1 PORT 702-025-000

## (undated) RX ORDER — FENTANYL CITRATE 50 UG/ML
INJECTION, SOLUTION INTRAMUSCULAR; INTRAVENOUS
Status: DISPENSED
Start: 2021-08-27